# Patient Record
Sex: MALE | Race: WHITE
[De-identification: names, ages, dates, MRNs, and addresses within clinical notes are randomized per-mention and may not be internally consistent; named-entity substitution may affect disease eponyms.]

---

## 2017-03-04 ENCOUNTER — HOSPITAL ENCOUNTER (OUTPATIENT)
Dept: HOSPITAL 18 - NAV LAB | Age: 72
Discharge: HOME | End: 2017-03-04
Payer: MEDICARE

## 2017-03-04 DIAGNOSIS — E78.2: ICD-10-CM

## 2017-03-04 DIAGNOSIS — E29.1: ICD-10-CM

## 2017-03-04 DIAGNOSIS — E11.9: ICD-10-CM

## 2017-03-04 DIAGNOSIS — E03.9: Primary | ICD-10-CM

## 2017-03-04 DIAGNOSIS — E55.9: ICD-10-CM

## 2017-03-04 LAB
ALP SERPL-CCNC: 76 U/L (ref 40–150)
ALT SERPL W P-5'-P-CCNC: 14 U/L (ref 0–55)
ANION GAP SERPL CALC-SCNC: 16 MMOL/L (ref 10–20)
AST SERPL-CCNC: 15 U/L (ref 5–34)
BASOPHILS # BLD AUTO: 0.1 THOU/UL (ref 0–0.2)
BASOPHILS NFR BLD AUTO: 1 % (ref 0–1)
BILIRUB SERPL-MCNC: 0.9 MG/DL (ref 0.2–1.2)
BUN SERPL-MCNC: 24 MG/DL (ref 8.4–25.7)
CALCIUM SERPL-MCNC: 9.3 MG/DL (ref 7.8–10.44)
CHLORIDE SERPL-SCNC: 106 MMOL/L (ref 98–107)
CO2 SERPL-SCNC: 23 MMOL/L (ref 23–31)
CREAT CL PREDICTED SERPL C-G-VRATE: 0 ML/MIN (ref 70–130)
EOSINOPHIL # BLD AUTO: 0.1 THOU/UL (ref 0–0.7)
EOSINOPHIL NFR BLD AUTO: 2 % (ref 0–10)
GLOBULIN SER CALC-MCNC: 2.9 G/DL (ref 2.4–3.5)
HCT VFR BLD CALC: 48.6 % (ref 42–52)
LYMPHOCYTES # BLD AUTO: 1.3 THOU/UL (ref 1.2–3.4)
LYMPHOCYTES NFR BLD AUTO: 18.7 % (ref 21–51)
MONOCYTES # BLD AUTO: 0.7 THOU/UL (ref 0.11–0.59)
MONOCYTES NFR BLD AUTO: 10.5 % (ref 0–10)
NEUTROPHILS # BLD AUTO: 4.8 THOU/UL (ref 1.4–6.5)
RBC # BLD AUTO: 5.28 MILL/UL (ref 4.7–6.1)
TESTOST FREE SERPL-MCNC: 99 PG/ML (ref 47–244)
WBC # BLD AUTO: 7.1 THOU/UL (ref 4.8–10.8)

## 2017-03-04 PROCEDURE — 84270 ASSAY OF SEX HORMONE GLOBUL: CPT

## 2017-03-04 PROCEDURE — 84443 ASSAY THYROID STIM HORMONE: CPT

## 2017-03-04 PROCEDURE — 82306 VITAMIN D 25 HYDROXY: CPT

## 2017-03-04 PROCEDURE — 85025 COMPLETE CBC W/AUTO DIFF WBC: CPT

## 2017-03-04 PROCEDURE — 84439 ASSAY OF FREE THYROXINE: CPT

## 2017-03-04 PROCEDURE — 83036 HEMOGLOBIN GLYCOSYLATED A1C: CPT

## 2017-03-04 PROCEDURE — 84403 ASSAY OF TOTAL TESTOSTERONE: CPT

## 2017-03-04 PROCEDURE — 80053 COMPREHEN METABOLIC PANEL: CPT

## 2017-03-04 PROCEDURE — 82672 ASSAY OF ESTROGEN: CPT

## 2017-07-07 ENCOUNTER — HOSPITAL ENCOUNTER (OUTPATIENT)
Dept: HOSPITAL 18 - NAV LAB | Age: 72
Discharge: HOME | End: 2017-07-07
Payer: MEDICARE

## 2017-07-07 DIAGNOSIS — E03.9: Primary | ICD-10-CM

## 2017-07-07 DIAGNOSIS — E55.9: ICD-10-CM

## 2017-07-07 DIAGNOSIS — E11.9: ICD-10-CM

## 2017-07-07 DIAGNOSIS — E78.2: ICD-10-CM

## 2017-07-07 DIAGNOSIS — E29.1: ICD-10-CM

## 2017-07-07 LAB
25(OH)D3+25(OH)D2 SERPL-MCNC: 44.9 NG/ML (ref 30–?)
ALBUMIN (W/TESTOSTERONE PANEL): 4.1 G/DL
ALBUMIN SERPL BCG-MCNC: 4.1 G/DL (ref 3.4–4.8)
ALP SERPL-CCNC: 77 U/L (ref 40–150)
ALT SERPL W P-5'-P-CCNC: 8 U/L (ref 8–55)
ANION GAP SERPL CALC-SCNC: 13 MMOL/L (ref 10–20)
AST SERPL-CCNC: 15 U/L (ref 5–34)
BILIRUB SERPL-MCNC: 1.1 MG/DL (ref 0.2–1.2)
BUN SERPL-MCNC: 22 MG/DL (ref 8.4–25.7)
CALCIUM SERPL-MCNC: 9.5 MG/DL (ref 7.8–10.44)
CHD RISK SERPL-RTO: 2.7 (ref ?–4.5)
CHLORIDE SERPL-SCNC: 104 MMOL/L (ref 98–107)
CHOLEST SERPL-MCNC: 90 MG/DL
CO2 SERPL-SCNC: 26 MMOL/L (ref 23–31)
CREAT CL PREDICTED SERPL C-G-VRATE: 0 ML/MIN (ref 70–130)
GLOBULIN SER CALC-MCNC: 3.1 G/DL (ref 2.4–3.5)
GLUCOSE SERPL-MCNC: 108 MG/DL (ref 83–110)
HDLC SERPL-MCNC: 33 MG/DL
LDLC SERPL CALC-MCNC: 44 MG/DL
POTASSIUM SERPL-SCNC: 4.8 MMOL/L (ref 3.5–5.1)
SODIUM SERPL-SCNC: 138 MMOL/L (ref 136–145)
T4 FREE SERPL-MCNC: 1.14 NG/DL (ref 0.7–1.48)
TESTOST FREE SERPL-MCNC: 117.7 PG/ML (ref 47–244)
TRIGL SERPL-MCNC: 64 MG/DL (ref ?–150)
TSH SERPL DL<=0.005 MIU/L-ACNC: 1.6 UIU/ML (ref 0.35–4.94)

## 2017-07-07 PROCEDURE — 36415 COLL VENOUS BLD VENIPUNCTURE: CPT

## 2017-07-07 PROCEDURE — 84439 ASSAY OF FREE THYROXINE: CPT

## 2017-07-07 PROCEDURE — 80061 LIPID PANEL: CPT

## 2017-07-07 PROCEDURE — 82306 VITAMIN D 25 HYDROXY: CPT

## 2017-07-07 PROCEDURE — 83036 HEMOGLOBIN GLYCOSYLATED A1C: CPT

## 2017-07-07 PROCEDURE — 84270 ASSAY OF SEX HORMONE GLOBUL: CPT

## 2017-07-07 PROCEDURE — 80053 COMPREHEN METABOLIC PANEL: CPT

## 2017-07-07 PROCEDURE — 84443 ASSAY THYROID STIM HORMONE: CPT

## 2017-07-07 PROCEDURE — 84403 ASSAY OF TOTAL TESTOSTERONE: CPT

## 2017-07-07 PROCEDURE — 82672 ASSAY OF ESTROGEN: CPT

## 2017-07-07 PROCEDURE — 82043 UR ALBUMIN QUANTITATIVE: CPT

## 2018-03-04 ENCOUNTER — HOSPITAL ENCOUNTER (EMERGENCY)
Dept: HOSPITAL 18 - NAV ERS | Age: 73
Discharge: HOME | End: 2018-03-04
Payer: MEDICARE

## 2018-03-04 DIAGNOSIS — R53.1: Primary | ICD-10-CM

## 2018-03-04 DIAGNOSIS — E11.9: ICD-10-CM

## 2018-03-04 DIAGNOSIS — I25.10: ICD-10-CM

## 2018-03-04 DIAGNOSIS — J45.909: ICD-10-CM

## 2018-03-04 DIAGNOSIS — E03.9: ICD-10-CM

## 2018-03-04 DIAGNOSIS — K21.9: ICD-10-CM

## 2018-03-04 DIAGNOSIS — I10: ICD-10-CM

## 2018-03-04 DIAGNOSIS — Z79.02: ICD-10-CM

## 2018-03-04 DIAGNOSIS — E78.2: ICD-10-CM

## 2018-03-04 DIAGNOSIS — Z79.899: ICD-10-CM

## 2018-03-04 DIAGNOSIS — I25.2: ICD-10-CM

## 2018-03-04 LAB
ANION GAP SERPL CALC-SCNC: 16 MMOL/L (ref 10–20)
BASOPHILS # BLD AUTO: 0.1 THOU/UL (ref 0–0.2)
BASOPHILS NFR BLD AUTO: 1.2 % (ref 0–1)
BUN SERPL-MCNC: 23 MG/DL (ref 8.4–25.7)
CALCIUM SERPL-MCNC: 9.1 MG/DL (ref 7.8–10.44)
CHLORIDE SERPL-SCNC: 104 MMOL/L (ref 98–107)
CK MB SERPL-MCNC: 0.9 NG/ML (ref 0–6.6)
CO2 SERPL-SCNC: 22 MMOL/L (ref 23–31)
CREAT CL PREDICTED SERPL C-G-VRATE: 0 ML/MIN (ref 70–130)
EOSINOPHIL # BLD AUTO: 0.3 THOU/UL (ref 0–0.7)
EOSINOPHIL NFR BLD AUTO: 2.8 % (ref 0–10)
GLUCOSE SERPL-MCNC: 140 MG/DL (ref 83–110)
HGB BLD-MCNC: 13.9 G/DL (ref 14–18)
LYMPHOCYTES # BLD AUTO: 1.1 THOU/UL (ref 1.2–3.4)
LYMPHOCYTES NFR BLD AUTO: 11.8 % (ref 21–51)
MCH RBC QN AUTO: 28 PG (ref 27–31)
MCV RBC AUTO: 86.9 FL (ref 80–94)
MONOCYTES # BLD AUTO: 0.8 THOU/UL (ref 0.11–0.59)
MONOCYTES NFR BLD AUTO: 9.1 % (ref 0–10)
NEUTROPHILS # BLD AUTO: 7 THOU/UL (ref 1.4–6.5)
NEUTROPHILS NFR BLD AUTO: 75.2 % (ref 42–75)
PLATELET # BLD AUTO: 153 THOU/UL (ref 130–400)
POTASSIUM SERPL-SCNC: 4.6 MMOL/L (ref 3.5–5.1)
PROT UR STRIP.AUTO-MCNC: 100 MG/DL
RBC # BLD AUTO: 4.95 MILL/UL (ref 4.7–6.1)
RBC UR QL AUTO: (no result) HPF (ref 0–3)
SODIUM SERPL-SCNC: 137 MMOL/L (ref 136–145)
SP GR UR STRIP: 1.02 (ref 1–1.03)
TROPONIN I SERPL DL<=0.01 NG/ML-MCNC: 0.02 NG/ML (ref ?–0.03)
WBC # BLD AUTO: 9.3 THOU/UL (ref 4.8–10.8)

## 2018-03-04 PROCEDURE — 93005 ELECTROCARDIOGRAM TRACING: CPT

## 2018-03-04 PROCEDURE — 83605 ASSAY OF LACTIC ACID: CPT

## 2018-03-04 PROCEDURE — 82553 CREATINE MB FRACTION: CPT

## 2018-03-04 PROCEDURE — 36416 COLLJ CAPILLARY BLOOD SPEC: CPT

## 2018-03-04 PROCEDURE — 85025 COMPLETE CBC W/AUTO DIFF WBC: CPT

## 2018-03-04 PROCEDURE — 85379 FIBRIN DEGRADATION QUANT: CPT

## 2018-03-04 PROCEDURE — 84484 ASSAY OF TROPONIN QUANT: CPT

## 2018-03-04 PROCEDURE — 80048 BASIC METABOLIC PNL TOTAL CA: CPT

## 2018-03-04 PROCEDURE — 96375 TX/PRO/DX INJ NEW DRUG ADDON: CPT

## 2018-03-04 PROCEDURE — 81003 URINALYSIS AUTO W/O SCOPE: CPT

## 2018-03-04 PROCEDURE — 96374 THER/PROPH/DIAG INJ IV PUSH: CPT

## 2018-03-04 PROCEDURE — 81015 MICROSCOPIC EXAM OF URINE: CPT

## 2018-03-04 PROCEDURE — 83880 ASSAY OF NATRIURETIC PEPTIDE: CPT

## 2018-03-04 PROCEDURE — S0028 INJECTION, FAMOTIDINE, 20 MG: HCPCS

## 2018-03-04 PROCEDURE — 71045 X-RAY EXAM CHEST 1 VIEW: CPT

## 2018-03-08 ENCOUNTER — HOSPITAL ENCOUNTER (OUTPATIENT)
Dept: HOSPITAL 92 - ERS | Age: 73
Setting detail: OBSERVATION
LOS: 2 days | Discharge: HOME | End: 2018-03-10
Attending: INTERNAL MEDICINE | Admitting: INTERNAL MEDICINE
Payer: MEDICARE

## 2018-03-08 VITALS — BODY MASS INDEX: 29.4 KG/M2

## 2018-03-08 DIAGNOSIS — I25.10: ICD-10-CM

## 2018-03-08 DIAGNOSIS — E87.1: ICD-10-CM

## 2018-03-08 DIAGNOSIS — K21.9: ICD-10-CM

## 2018-03-08 DIAGNOSIS — Z79.84: ICD-10-CM

## 2018-03-08 DIAGNOSIS — Z79.899: ICD-10-CM

## 2018-03-08 DIAGNOSIS — J45.909: ICD-10-CM

## 2018-03-08 DIAGNOSIS — Z95.810: ICD-10-CM

## 2018-03-08 DIAGNOSIS — Z79.51: ICD-10-CM

## 2018-03-08 DIAGNOSIS — Z95.5: ICD-10-CM

## 2018-03-08 DIAGNOSIS — Z98.890: ICD-10-CM

## 2018-03-08 DIAGNOSIS — K52.9: ICD-10-CM

## 2018-03-08 DIAGNOSIS — I50.23: ICD-10-CM

## 2018-03-08 DIAGNOSIS — Z79.82: ICD-10-CM

## 2018-03-08 DIAGNOSIS — N18.9: ICD-10-CM

## 2018-03-08 DIAGNOSIS — E03.9: ICD-10-CM

## 2018-03-08 DIAGNOSIS — Z79.02: ICD-10-CM

## 2018-03-08 DIAGNOSIS — F32.9: ICD-10-CM

## 2018-03-08 DIAGNOSIS — E11.22: Primary | ICD-10-CM

## 2018-03-08 DIAGNOSIS — Z95.1: ICD-10-CM

## 2018-03-08 DIAGNOSIS — I25.2: ICD-10-CM

## 2018-03-08 DIAGNOSIS — E78.5: ICD-10-CM

## 2018-03-08 DIAGNOSIS — Z90.89: ICD-10-CM

## 2018-03-08 DIAGNOSIS — I13.0: ICD-10-CM

## 2018-03-08 LAB
ALBUMIN SERPL BCG-MCNC: 3.8 G/DL (ref 3.4–4.8)
ALP SERPL-CCNC: 88 U/L (ref 40–150)
ALT SERPL W P-5'-P-CCNC: 11 U/L (ref 8–55)
ANION GAP SERPL CALC-SCNC: 15 MMOL/L (ref 10–20)
AST SERPL-CCNC: 13 U/L (ref 5–34)
BASOPHILS # BLD AUTO: 0.1 THOU/UL (ref 0–0.2)
BASOPHILS NFR BLD AUTO: 0.6 % (ref 0–1)
BILIRUB SERPL-MCNC: 0.6 MG/DL (ref 0.2–1.2)
BUN SERPL-MCNC: 19 MG/DL (ref 8.4–25.7)
CALCIUM SERPL-MCNC: 9.5 MG/DL (ref 7.8–10.44)
CHLORIDE SERPL-SCNC: 102 MMOL/L (ref 98–107)
CK MB SERPL-MCNC: 1 NG/ML (ref 0–6.6)
CK SERPL-CCNC: 45 U/L (ref 30–200)
CO2 SERPL-SCNC: 21 MMOL/L (ref 23–31)
CREAT CL PREDICTED SERPL C-G-VRATE: 0 ML/MIN (ref 70–130)
EOSINOPHIL # BLD AUTO: 0.4 THOU/UL (ref 0–0.7)
EOSINOPHIL NFR BLD AUTO: 4.4 % (ref 0–10)
GLOBULIN SER CALC-MCNC: 3.3 G/DL (ref 2.4–3.5)
GLUCOSE SERPL-MCNC: 153 MG/DL (ref 83–110)
HGB BLD-MCNC: 14.1 G/DL (ref 14–18)
LYMPHOCYTES # BLD: 1 THOU/UL (ref 1.2–3.4)
LYMPHOCYTES NFR BLD AUTO: 10.4 % (ref 21–51)
MCH RBC QN AUTO: 29.3 PG (ref 27–31)
MCV RBC AUTO: 91.2 FL (ref 80–94)
MONOCYTES # BLD AUTO: 0.9 THOU/UL (ref 0.11–0.59)
MONOCYTES NFR BLD AUTO: 9.8 % (ref 0–10)
NEUTROPHILS # BLD AUTO: 6.9 THOU/UL (ref 1.4–6.5)
NEUTROPHILS NFR BLD AUTO: 74.8 % (ref 42–75)
PLATELET # BLD AUTO: 189 THOU/UL (ref 130–400)
POTASSIUM SERPL-SCNC: 4.4 MMOL/L (ref 3.5–5.1)
RBC # BLD AUTO: 4.81 MILL/UL (ref 4.7–6.1)
SODIUM SERPL-SCNC: 134 MMOL/L (ref 136–145)
TROPONIN I SERPL DL<=0.01 NG/ML-MCNC: 0.02 NG/ML (ref ?–0.03)
TROPONIN I SERPL DL<=0.01 NG/ML-MCNC: 0.03 NG/ML (ref ?–0.03)
TROPONIN I SERPL DL<=0.01 NG/ML-MCNC: 0.03 NG/ML (ref ?–0.03)
WBC # BLD AUTO: 9.2 THOU/UL (ref 4.8–10.8)

## 2018-03-08 PROCEDURE — 96376 TX/PRO/DX INJ SAME DRUG ADON: CPT

## 2018-03-08 PROCEDURE — 82550 ASSAY OF CK (CPK): CPT

## 2018-03-08 PROCEDURE — 80053 COMPREHEN METABOLIC PANEL: CPT

## 2018-03-08 PROCEDURE — 83880 ASSAY OF NATRIURETIC PEPTIDE: CPT

## 2018-03-08 PROCEDURE — 94640 AIRWAY INHALATION TREATMENT: CPT

## 2018-03-08 PROCEDURE — 82962 GLUCOSE BLOOD TEST: CPT

## 2018-03-08 PROCEDURE — A4216 STERILE WATER/SALINE, 10 ML: HCPCS

## 2018-03-08 PROCEDURE — 96374 THER/PROPH/DIAG INJ IV PUSH: CPT

## 2018-03-08 PROCEDURE — 99285 EMERGENCY DEPT VISIT HI MDM: CPT

## 2018-03-08 PROCEDURE — 36416 COLLJ CAPILLARY BLOOD SPEC: CPT

## 2018-03-08 PROCEDURE — 97139 UNLISTED THERAPEUTIC PX: CPT

## 2018-03-08 PROCEDURE — 82553 CREATINE MB FRACTION: CPT

## 2018-03-08 PROCEDURE — 85379 FIBRIN DEGRADATION QUANT: CPT

## 2018-03-08 PROCEDURE — 80048 BASIC METABOLIC PNL TOTAL CA: CPT

## 2018-03-08 PROCEDURE — G0378 HOSPITAL OBSERVATION PER HR: HCPCS

## 2018-03-08 PROCEDURE — 84484 ASSAY OF TROPONIN QUANT: CPT

## 2018-03-08 PROCEDURE — 36415 COLL VENOUS BLD VENIPUNCTURE: CPT

## 2018-03-08 PROCEDURE — 93798 PHYS/QHP OP CAR RHAB W/ECG: CPT

## 2018-03-08 PROCEDURE — 93306 TTE W/DOPPLER COMPLETE: CPT

## 2018-03-08 PROCEDURE — 85025 COMPLETE CBC W/AUTO DIFF WBC: CPT

## 2018-03-08 PROCEDURE — 71275 CT ANGIOGRAPHY CHEST: CPT

## 2018-03-08 PROCEDURE — 71045 X-RAY EXAM CHEST 1 VIEW: CPT

## 2018-03-08 PROCEDURE — 93005 ELECTROCARDIOGRAM TRACING: CPT

## 2018-03-08 RX ADMIN — Medication SCH ML: at 21:36

## 2018-03-08 NOTE — HP
PRIMARY CARE PHYSICIAN:  Italo Bansal M.D.

 

CHIEF COMPLAINT:  Shortness of breath.

 

HISTORY OF PRESENT ILLNESS:  Mr. Bradford is a pleasant 72-year-old gentleman who was seen at [facility] on 2018.  He reports that he has not been feeling well over the last s
everal weeks.  He reports having flu-like symptoms in 2017.  He also reports being treated with st
eroids for possible allergies or asthma exacerbation over the last few weeks.  He reports orthopnea a
nd paroxysmal nocturnal dyspnea.  He denies any lower extremity edema.  He reports cough that is occa
sionally productive of mucus.  He denies any wheezing.  He denies any nausea or vomiting.  He denies 
any chest pain.  He denies any fevers.

 

He reports feeling depressed.  He reports that his wife  in  and his only son  in  last year.  His mother  in 2017.

 

He denies any abdominal pain.  He denies any diarrhea.  He reports generalized weakness.  He reports 
feeling low in terms of energy.  He reports being evaluated at a different emergency room a few days 
ago.

 

REVIEW OF SYSTEMS:  The following complete review of systems was

negative, unless otherwise mentioned in the HPI or below:

Constitutional:  Weight loss or gain, ability to conduct usual

activities.

Skin:  Rash, itching.

Eyes:  Double vision, pain.

ENT/Mouth:  Nose bleeding, neck stiffness, pain, tenderness.

Cardiovascular:  Palpitations, dyspnea on exertion, orthopnea.

Respiratory:  Shortness of breath, wheezing, cough, hemoptysis, 

fever or night sweats.

Gastrointestinal:  Poor appetite, abdominal pain, heartburn, 

nausea, vomiting, constipation, or diarrhea.

Genitourinary:  Urgency, frequency, dysuria, nocturia.

Musculoskeletal:  Pain, swelling.

Neurologic/Psychiatric:  Anxiety, depression.

Allergy/Immunologic:  Skin rash, bleeding tendency.

 

PAST MEDICAL HISTORY:  Significant for chronic diarrhea x5 years, coronary artery disease, myocardial
 infarction, PCI with stent, diabetes mellitus type 2, hypothyroidism, diverticulosis, gallstones, ga
stroesophageal reflux disease, dyslipidemia, hypertension, and asthma.

 

PAST SURGICAL HISTORY:  Significant for coronary artery bypass graft, 4 vessels, AICD placement, sten
t placement.

 

SOCIAL HISTORY:  The patient denies tobacco use or recreational drug use.  He drinks alcohol occasion
ally.

 

CODE STATUS:  I discussed his code status.  He is FULL CODE.

 

FAMILY HISTORY:  He denies any family history of premature coronary artery disease.

 

ALLERGIES:  No known drug allergies.

 

CURRENT MEDICATIONS:  Include Primidone 50 mg 2 times a day, aspirin 81 mg daily, atorvastatin 20 mg 
daily, Astelin nasal spray daily, Coreg 12.5 mg 2 times a day, Zyrtec 1 tablet daily, Plavix half a t
ablet daily, Advair HFA 2 puffs daily, levothyroxine 75 mcg daily, lisinopril 2.5 mg 2 times a day, m
agnesium oxide 400 mg daily, Zantac 150 mg 2 times a day, Janumet half a tablet 2 times a day, spiron
olactone 25 mg daily, Torsemide 10 mg daily.

 

PHYSICAL EXAMINATION:

GENERAL:  Mr. Fenton is awake and alert, not in acute distress.

VITAL SIGNS:  Blood pressure is 130/72, pulse is 79, he is breathing at rate of 20 and saturating 96%
 on room air.  He is afebrile.

EYES:  No scleral icterus.  No conjunctival pallor.

ENT:  Moist mucosal membranes, no oropharyngeal erythema or exudates.

NECK:  Supple, nontender, normal range of movement, he has jugular venous distention.

RESPIRATORY:  Accessory muscles of breathing are not active.  Chest wall movements are symmetric bila
terally.

LUNGS:  Clear to auscultation without wheeze, rhonchi or crepitations.

CARDIOVASCULAR:  S1 and S2 are heard, regular.

LUNGS:  Peripheral pulses palpable.  No pericardial rub, no carotid bruit.

ABDOMEN:  Soft, nontender, bowel sounds are heard, no hepatomegaly, no splenomegaly.

NEUROLOGIC:  Cranial nerves II-XII intact.  Deep tendon reflexes are 2+.

SKIN:  No rashes or subcutaneous nodules.  He has 1+ bilateral ankle edema.

MUSCULOSKELETAL:  Power is 5/5 in all 4 extremities.

LYMPHATIC:  No cervical lymphadenopathy.

PSYCHIATRIC:  Flat affect, the patient is oriented to person, place, and time.

 

LABORATORY DATA:  Mr. Bradford's labs and investigations were reviewed.  I reviewed his electrocardiogr
am, which shows normal sinus rhythm, no ST changes to suggest an acute coronary syndrome.  I also rev
iewed his chest x-ray, which does not show any pulmonary infiltrates or pulmonary edema.

 

Laboratory investigation show an unremarkable CBC, hyponatremia with sodium of 134, normal potassium,
 normal creatinine, normal LFTs, elevated BNP of 2673 and normal troponin I.

 

ASSESSMENT AND PLAN:  Mr. Bradford is a pleasant 72-year-old gentleman who was seen at [facility] on 2018.  His problem list includes:

1.  Shortness of breath:  Mr. Bradford is presenting with shortness of breath.  At this point in time, 
several possibilities exist, including congestive heart failure, asthma and other pulmonary causes an
d psychological causes.  He will be admitted to the hospital for further management.

2.  Congestive heart failure.  He will be treated with intravenous diuretics.  Cardiology service chayo
l be consulted for optimization of his medications.  We will also check 2D echocardiogram.

3.  Hyponatremia:  Mild, we will recheck.

4.  Diabetes mellitus type 2, start Accu-Cheks, insulin sliding scale.

5.  Coronary artery disease:  Appears stable at this time.

6.  Hypothyroidism:  Continue thyroid replacement therapy.

7.  Dyslipidemia:  Continue statin.

8.  Hypertension:  Monitor vital signs, titrate antihypertensives as needed.

9.  Asthma:  P.r.n., bronchodilators and nebulizers.

10.  Depression:  Suspected.  I have advised the patient to follow up with mental health services as 
outpatient.

 

Many thanks for allowing me to participate in your patient's care.  Please feel free to contact me wi
th any questions or concerns.

 

LEVEL OF RISK:  High.

 

LEVEL OF COMPLEXITY:  High.

## 2018-03-08 NOTE — RAD
CHEST 1 VIEW:

 

Date:  03/08/18 

 

HISTORY:  

Dyspnea. 

 

COMPARISON:  

03/04/18. 

 

FINDINGS:

Cardiac silhouette is magnified and upper limits of normal in size. Pulmonary vasculature remains upp
er limits of normal. Mediastinum is midline with postoperative changes, aortic calcification, and a m
ultilead left subclavian cardiac electronic device. No lobar consolidation or evidence of pneumothora
x. 

 

IMPRESSION: 

Borderline cardiomegaly and other chronic-type findings are stable. No active cardiopulmonary abnorma
lities are demonstrated. 

 

 

POS: [facility]

## 2018-03-09 LAB
ANION GAP SERPL CALC-SCNC: 13 MMOL/L (ref 10–20)
BASOPHILS # BLD AUTO: 0.1 THOU/UL (ref 0–0.2)
BASOPHILS NFR BLD AUTO: 0.6 % (ref 0–1)
BUN SERPL-MCNC: 19 MG/DL (ref 8.4–25.7)
CALCIUM SERPL-MCNC: 9.3 MG/DL (ref 7.8–10.44)
CHLORIDE SERPL-SCNC: 100 MMOL/L (ref 98–107)
CO2 SERPL-SCNC: 27 MMOL/L (ref 23–31)
CREAT CL PREDICTED SERPL C-G-VRATE: 76 ML/MIN (ref 70–130)
EOSINOPHIL # BLD AUTO: 0.4 THOU/UL (ref 0–0.7)
EOSINOPHIL NFR BLD AUTO: 4.4 % (ref 0–10)
GLUCOSE SERPL-MCNC: 138 MG/DL (ref 83–110)
HGB BLD-MCNC: 13.6 G/DL (ref 14–18)
LYMPHOCYTES # BLD: 1.3 THOU/UL (ref 1.2–3.4)
LYMPHOCYTES NFR BLD AUTO: 14.8 % (ref 21–51)
MCH RBC QN AUTO: 29.2 PG (ref 27–31)
MCV RBC AUTO: 90.4 FL (ref 80–94)
MONOCYTES # BLD AUTO: 0.9 THOU/UL (ref 0.11–0.59)
MONOCYTES NFR BLD AUTO: 10.7 % (ref 0–10)
NEUTROPHILS # BLD AUTO: 6 THOU/UL (ref 1.4–6.5)
NEUTROPHILS NFR BLD AUTO: 69.5 % (ref 42–75)
PLATELET # BLD AUTO: 192 THOU/UL (ref 130–400)
POTASSIUM SERPL-SCNC: 4 MMOL/L (ref 3.5–5.1)
RBC # BLD AUTO: 4.65 MILL/UL (ref 4.7–6.1)
SODIUM SERPL-SCNC: 136 MMOL/L (ref 136–145)
WBC # BLD AUTO: 8.7 THOU/UL (ref 4.8–10.8)

## 2018-03-09 RX ADMIN — MOMETASONE FUROATE AND FORMOTEROL FUMARATE DIHYDRATE SCH PUFF: 100; 5 AEROSOL RESPIRATORY (INHALATION) at 07:02

## 2018-03-09 RX ADMIN — ALOGLIPTIN SCH MG: 6.25 TABLET, FILM COATED ORAL at 19:54

## 2018-03-09 RX ADMIN — ASPIRIN SCH MG: 81 TABLET ORAL at 09:07

## 2018-03-09 RX ADMIN — Medication SCH ML: at 09:09

## 2018-03-09 RX ADMIN — MOMETASONE FUROATE AND FORMOTEROL FUMARATE DIHYDRATE SCH PUFF: 100; 5 AEROSOL RESPIRATORY (INHALATION) at 20:30

## 2018-03-09 RX ADMIN — Medication SCH ML: at 19:55

## 2018-03-09 RX ADMIN — ALOGLIPTIN SCH MG: 6.25 TABLET, FILM COATED ORAL at 09:09

## 2018-03-09 NOTE — PDOC.PN
- Subjective


Encounter Start Date: 03/09/18


Encounter Start Time: 10:00





Pt seen for followup re: shortness of breath.  Denies chest pain.  SOBOE+.  No 

nausea or vomiting.  Feels depressed.  No suicidal or homicidal ideation.





- Objective


Resuscitation Status: 


 











Resuscitation Status           FULL:Full Resuscitation














MAR Reviewed: Yes


Vital Signs & Weight: 


 Vital Signs (12 hours)











  Temp Pulse Pulse Pulse Resp BP BP


 


 03/09/18 15:23  98.3 F  74    16  


 


 03/09/18 11:44       


 


 03/09/18 11:23  97.8 F  76    24 H  


 


 03/09/18 09:54    80  86   143/75 H  132/66


 


 03/09/18 07:51  97.9 F  85    16  


 


 03/09/18 07:28  98.2 F  85    20  


 


 03/09/18 07:02   85    16  














  BP Pulse Ox Pulse Ox Pulse Ox


 


 03/09/18 15:23  114/65  95  


 


 03/09/18 11:44   98  


 


 03/09/18 11:23  116/57 L  93 L  


 


 03/09/18 09:54    94 L  95


 


 03/09/18 07:51    


 


 03/09/18 07:28  140/73  96  


 


 03/09/18 07:02   97  








 Weight











Weight                         211 lb














I&O: 


 











 03/08/18 03/09/18 03/10/18





 06:59 06:59 06:59


 


Intake Total   20


 


Output Total  950 600


 


Balance  -950 -580











Result Diagrams: 


 03/09/18 03:55





 03/09/18 03:55


Additional Labs: 


 Accuchecks











  03/09/18 03/08/18





  08:31 21:34


 


POC Glucose  155 H  182 H











EKG Reviewed by me: Yes (Tele:  NSR)





Phys Exam





- Physical Examination


Constitutional: NAD


HEENT: moist MMs


Neck: supple


Respiratory: clear to auscultation bilateral


Cardiovascular: RRR


Gastrointestinal: soft


Musculoskeletal: edema present


Neurological: moves all 4 limbs


Psychiatric: A&O x 3


Deviation from normal: Flat affect





Dx/Plan


(1) Shortness of breath


Code(s): R06.02 - SHORTNESS OF BREATH   Status: Acute   Comment: Check d-dimer 

to r/o PE, continue diuretics   





(2) CHF (congestive heart failure)


Code(s): I50.9 - HEART FAILURE, UNSPECIFIED   Status: Acute   Comment: Continue 

diuretics, await cardiology consult and 2D echo   





(3) Hypothyroidism


Code(s): E03.9 - HYPOTHYROIDISM, UNSPECIFIED   Status: Chronic   Comment: 

continue replacement therapy   





(4) Dyslipidemia


Code(s): E78.5 - HYPERLIPIDEMIA, UNSPECIFIED   Status: Chronic   Comment: 

continue statin   





(5) Diverticulosis


Code(s): K57.90 - DVRTCLOS OF INTEST, PART UNSP, W/O PERF OR ABSCESS W/O BLEED 

  Status: Chronic   Comment: Stable   





(6) Depression


Code(s): F32.9 - MAJOR DEPRESSIVE DISORDER, SINGLE EPISODE, UNSPECIFIED   Status

: Chronic   Comment: Discussed with pt re: need to establish care with a 

psychiatrist   





- Plan


out of bed/ambulate





* .








Review of Systems





- Review of Systems


Constitutional: negative: fever, chills, sweats, weakness, malaise


Respiratory: Shortness of Breath, SOB with Excertion.  negative: Cough, Dry, 

Hemoptysis, Pleuritic Pain, Sputum, Wheezing


Cardiovascular: negative: chest pain, palpitations, orthopnea, paroxysmal 

nocturnal dyspnea, edema, light headedness





- Medications/Allergies


Allergies/Adverse Reactions: 


 Allergies











Allergy/AdvReac Type Severity Reaction Status Date / Time


 


No Known Drug Allergies Allergy   Verified 03/08/18 17:48











Medications: 


 Current Medications





Acetaminophen (Tylenol)  650 mg PO Q4H PRN


   PRN Reason: Headache/Fever or Pain


Acetaminophen (Tylenol)  650 mg TN Q4H PRN


   PRN Reason: Headache/Fever or Pain


Albuterol Sulfate (Proventil Hfa)  2 puff INH Q4H PRN


   PRN Reason: SOB &/or Wheezing


   Last Admin: 03/08/18 22:20 Dose:  2 puff


Alogliptin Benzoate (Alogliptin)  6.25 mg PO BID [facility]


   Last Admin: 03/09/18 09:09 Dose:  6.25 mg


Aspirin (Ecotrin)  81 mg PO DAILY [facility]


   Last Admin: 03/09/18 09:07 Dose:  81 mg


Atorvastatin Calcium (Lipitor)  20 mg PO HS [facility]


   Last Admin: 03/08/18 21:33 Dose:  20 mg


Bisacodyl (Dulcolax)  10 mg PO DAILYPRN PRN


   PRN Reason: Constipation


Carvedilol (Coreg)  25 mg PO BID[facility]


   Last Admin: 03/09/18 09:06 Dose:  25 mg


Clopidogrel Bisulfate (Plavix)  37.5 mg PO DAILY [facility]


   Last Admin: 03/09/18 09:07 Dose:  37.5 mg


Dextrose/Water (Dextrose 50%)  25 gm SLOW IVP PRN PRN


   PRN Reason: Hypoglycemia


Famotidine (Pepcid)  20 mg PO BID [facility]


   Last Admin: 03/09/18 09:08 Dose:  20 mg


Furosemide (Lasix)  40 mg SLOW IVP 0600,1400 [facility]


   Last Admin: 03/09/18 14:08 Dose:  40 mg


Glucagon (Glucagon)  1 mg IM PRN PRN


   PRN Reason: Hypoglycemia


Dextrose/Water (D5w)  1,000 mls @ 0 mls/hr IV .Q0M PRN; As Directed


   PRN Reason: Hypoglycemia


Insulin Human Lispro (Humalog)  0 units SC .MILD SLIDING SCALE PRN


   PRN Reason: Mild Correctional Scale


Levothyroxine Sodium (Synthroid)  75 mcg PO 0600 [facility]


   Last Admin: 03/09/18 06:06 Dose:  75 mcg


Lisinopril (Zestril)  2.5 mg PO BID [facility]


   Last Admin: 03/09/18 09:05 Dose:  2.5 mg


Loratadine (Claritin)  10 mg PO DAILY [facility]


   Last Admin: 03/09/18 09:08 Dose:  10 mg


Metformin HCl (Glucophage)  500 mg PO BID [facility]


   Last Admin: 03/09/18 09:08 Dose:  500 mg


Mometasone Furoate/Formoterol Fumar (Dulera 100 Mcg/5 Mcg Inhaler)  2 puff INH 

BID-RT [facility]


   Last Admin: 03/09/18 07:02 Dose:  2 puff


Primidone (Mysoline)  50 mg PO BID [facility]


   Last Admin: 03/09/18 09:06 Dose:  50 mg


Sodium Chloride (Flush - Normal Saline)  10 ml IVF Q12HR [facility]


   Last Admin: 03/09/18 09:09 Dose:  10 ml


Sodium Chloride (Flush - Normal Saline)  10 ml IVF PRN PRN


   PRN Reason: Saline Flush


Sodium Chloride (Ocean Nasal Spray 0.65%)  0 ml EA NARE PRN PRN


   PRN Reason: Nasal Dryness


   Last Admin: 03/09/18 03:52 Dose:  1 spr


Spironolactone (Aldactone)  25 mg PO DAILY [facility]


   Last Admin: 03/09/18 09:06 Dose:  25 mg

## 2018-03-09 NOTE — CON
DATE OF CONSULTATION:  2018

 

HISTORY:  Sunday Bradford is a 72-year-old white male that I initially evaluated 
in the office in 2015.  He returned several times for followup, however, the 
last time I saw him was in 2017.

 

In 2008, he presented to [facility] with an anterior STEMI.  He had 
60% ostial LAD, 100% mid LAD, 40% first obtuse marginal, 75%-80% mid RCA.  
Promus 3.0 x 12 mm stent was placed in the ostial LAD and Promus 2.25 x 23 mm 
stent was placed in the mid LAD.  Also, Promus 3.0 x 23 mm stent was placed in 
the mid RCA.  Peak CK was 1198,  and troponin I was 55.

 

He began to have worsening ejection fraction and more dyspnea on exertion.  He 
underwent repeat catheterization and was found to have left main and 2-vessel 
coronary artery disease in 2013.  He then underwent CABG x4 at the [facility] by 
Dr. Owens.  There was LIMA to LAD, vein graft to the third diagonal, obtuse 
marginal and right posterior descending.  Dual chamber ICD was placed in .  
When I initially saw him, he was doing well, walking 3 miles on the treadmill.  
Last echo was in 2015 when he had an ejection fraction of 25%-30%.  He was 
last seen in 2017.  At that time, he denied any chest discomfort or 
shortness of breath.  He was to return 6 months later, but has never come back 
for followup.

 

He now presents with increased cough and shortness of breath.  He started to 
have flu-like symptoms in December.  He was placed on steroids for asthma 
exacerbation, but that did not seem to improve his symptoms.  He also has 
developed orthopnea and PND and has difficulty lying flat in bed.  He has not 
noted any significant leg edema.  He denies any chest discomfort.

 

PAST MEDICAL HISTORY:  Diabetes, hypertension, hypercholesterolemia, stent 
placement, coronary artery disease, hypothyroidism, cholelithiasis, GERD, 
history of asthma, history of myocardial infarction.

 

OPERATIONS:  CABG, ICD insertion, right shoulder surgery, tonsillectomy.

 

MEDICATIONS:  Albuterol 2 puffs q.4 hours p.r.n., aspirin 81 daily, 
atorvastatin 20 mg at bedtime, carvedilol 25 mg b.i.d., Zyrtec 10 daily, Plavix 
0.5 mg daily, Advair 1 puff b.i.d., levothyroxine 75 mcg daily, lisinopril 2.5 
mg b.i.d., Primidone 50 b.i.d., spironolactone 25 daily, Torsemide 10 mg daily (
the patient should be taking one-half of a 20 mg torsemide daily, not just 
p.r.n.).

 

ALLERGIES:  None.

 

SOCIAL HISTORY:  He does not smoke or drink.  His wife  in 2015.  His 
dog then .  His son  2017 and then his mother  2017.

 

FAMILY HISTORY:  Negative for coronary artery disease.

 

REVIEW OF SYSTEMS:  A 12-point review of systems is otherwise unremarkable.

 

PHYSICAL EXAMINATION:

VITAL SIGNS:  Blood pressure 116/57, pulse 76.

HEENT:  PERRL.

NECK:  Supple.

CHEST:  Clear.

CARDIAC:  S1, S2 normal without any S3, S4 or murmurs.

ABDOMEN:  Normal bowel sounds without tenderness or organomegaly.

EXTREMITIES:  Revealed no clubbing or cyanosis.  There is trace ankle edema.

NEUROLOGIC:  Grossly intact.

SKIN:  Warm and dry.

 

LABORATORY:  EKG is not found on the chart.  Chest x-ray revealed borderline 
cardiomegaly, but no significant abnormality.  Hemoglobin 13.6, hematocrit 42.0
, white count 8700, platelets 192,000.  Sodium 136, potassium 4.0, chloride 100
, carbon dioxide 27, BUN 19, creatinine 1.20.  Troponin I is up to 0.030.  BNP 
2673.7.

 

IMPRESSION AND PLAN:

1.  The patient had a significantly elevated BNP, although no overt volume 
overload seen on exam.  He will continue to be diuresed and certainly needs to 
be on a higher diuretic dose at home.  Also, in checking his ICD, it was found 
that his ICD is at end of life.

2.  Status post coronary artery bypass graft.

3.  History of myocardial infarction with stent placement.

4.  Status post dual chamber ICD, which is currently at elective replacement 
indicator.

5.  Hypertension.

6.  Hypercholesterolemia.

7.  Diabetes.

8.  Chronic kidney disease.

9.  Depression with death of his wife, dog, mother and son.

 

[facility]

## 2018-03-09 NOTE — CT
CT ANGIOGRAM OF THE CHEST:

 

HISTORY: 

Shortness of breath.  Elevated D-dimer.

 

COMPARISON: 

None.

 

TECHNIQUE: 

CT angiogram of the chest is performed in the axial plane.  Bilateral oblique and coronal 3-dimension
al reformatted images are submitted for interpretation.

 

FINDINGS: 

No mediastinal mass, lymphadenopathy, or hematoma.  Heart size is within normal limits.  No pericardi
al effusion.  Suboptimal evaluation of the aorta due to the lack of contrast, secondary to timing of 
bolus.  There is extensive coronary calcification.

 

There is reflux of contrast into the IVC and hepatic venous system due to right heart failure.  Solid
 organs are grossly unremarkable.  CT evidence of cholelithiasis without definite CT evidence of chol
ecystitis.

 

Trachea and central bronchi are patent.  No consolidation or masses within the lung parenchyma.  Scar
ring and atelectasis in both lower lobes.  No pleural effusion or pneumothorax.

 

Adequate contrast opacification of the pulmonary arterial system to the level of the segmental arteri
es.  No filling defect to imply thromboembolism.

 

No lytic or blastic lesions within the osseous structures.

 

IMPRESSION: 

1.  No evidence of pulmonary artery embolism to the level of the segmental arteries.

 

2.  Right heart failure.

 

POS: [facility]

## 2018-03-10 VITALS — SYSTOLIC BLOOD PRESSURE: 122 MMHG | TEMPERATURE: 98 F | DIASTOLIC BLOOD PRESSURE: 69 MMHG

## 2018-03-10 LAB
ANION GAP SERPL CALC-SCNC: 12 MMOL/L (ref 10–20)
BASOPHILS # BLD AUTO: 0.1 THOU/UL (ref 0–0.2)
BASOPHILS NFR BLD AUTO: 0.9 % (ref 0–1)
BUN SERPL-MCNC: 19 MG/DL (ref 8.4–25.7)
CALCIUM SERPL-MCNC: 9.1 MG/DL (ref 7.8–10.44)
CHLORIDE SERPL-SCNC: 99 MMOL/L (ref 98–107)
CO2 SERPL-SCNC: 28 MMOL/L (ref 23–31)
CREAT CL PREDICTED SERPL C-G-VRATE: 75 ML/MIN (ref 70–130)
EOSINOPHIL # BLD AUTO: 0.4 THOU/UL (ref 0–0.7)
EOSINOPHIL NFR BLD AUTO: 4.6 % (ref 0–10)
GLUCOSE SERPL-MCNC: 144 MG/DL (ref 83–110)
HGB BLD-MCNC: 14.1 G/DL (ref 14–18)
LYMPHOCYTES # BLD: 1.1 THOU/UL (ref 1.2–3.4)
LYMPHOCYTES NFR BLD AUTO: 13.4 % (ref 21–51)
MCH RBC QN AUTO: 29.3 PG (ref 27–31)
MCV RBC AUTO: 89.9 FL (ref 80–94)
MONOCYTES # BLD AUTO: 1 THOU/UL (ref 0.11–0.59)
MONOCYTES NFR BLD AUTO: 12.7 % (ref 0–10)
NEUTROPHILS # BLD AUTO: 5.5 THOU/UL (ref 1.4–6.5)
NEUTROPHILS NFR BLD AUTO: 68.4 % (ref 42–75)
PLATELET # BLD AUTO: 200 THOU/UL (ref 130–400)
POTASSIUM SERPL-SCNC: 3.7 MMOL/L (ref 3.5–5.1)
RBC # BLD AUTO: 4.82 MILL/UL (ref 4.7–6.1)
SODIUM SERPL-SCNC: 135 MMOL/L (ref 136–145)
WBC # BLD AUTO: 8.1 THOU/UL (ref 4.8–10.8)

## 2018-03-10 RX ADMIN — MOMETASONE FUROATE AND FORMOTEROL FUMARATE DIHYDRATE SCH PUFF: 100; 5 AEROSOL RESPIRATORY (INHALATION) at 06:16

## 2018-03-10 RX ADMIN — ASPIRIN SCH MG: 81 TABLET ORAL at 09:37

## 2018-03-10 RX ADMIN — ALOGLIPTIN SCH MG: 6.25 TABLET, FILM COATED ORAL at 09:37

## 2018-03-10 RX ADMIN — Medication SCH ML: at 09:37

## 2018-03-10 NOTE — DIS
DATE OF ADMISSION:  03/08/2018

 

DATE OF DISCHARGE:  03/10/2018

 

PRIMARY CARE PROVIDER:  Italo Bansal M.D.

 

DISCHARGE DIAGNOSES:

1.  Acute on chronic systolic congestive heart failure.

2.  Depression.

 

CONSULTATIONS DURING THIS HOSPITALIZATION:  Dr. Lloyd Christine, Cardiology.

 

CONDITION OF PATIENT ON THE DAY OF DISCHARGE:  Stable.  I assessed Mr. Bradford on the day of discharge
.  He denies any chest pain.  Shortness of breath has improved.

 

PHYSICAL EXAMINATION:

VITAL SIGNS:  Stable and he is maintaining good oxygen saturations on room air.

CARDIOVASCULAR:  S1 and S2 are heard, regular.

LUNGS:  Clear to auscultation bilaterally.

 

DISCHARGE MEDICATIONS:  Proventil HFA 2 puffs every 4 hours as needed, aspirin 81 mg daily, Lipitor 2
0 mg at bedtime, carvedilol 25 mg 2 times a day, cetirizine 10 mg daily, Plavix 37.5 mg daily, Advair
 1 puff 2 times a day, Synthroid 75 mcg daily, lisinopril 2.5 mg 2 times a day, primidone 50 mg 2 robert
es a day, Janumet half a tablet 2 times a day, spironolactone 25 mg daily, Demadex 10 mg daily, Zanta
c 150 mg 2 times a day.

 

HOSPITAL COURSE:  Mr. Bradford is a pleasant 72-year-old gentleman who was admitted to [facility] on observation status on 03/08/2018 for shortness of breath.  He was found to have 
acute exacerbation of congestive heart failure.  He was treated with intravenous diuretics, with impr
ovement of his symptoms.

 

He was also depressed.  He has been advised to follow up with his primary care provider for managemen
t of depression or for a psychiatric referral.  He denied any suicidal or homicidal ideation during t
his hospitalization.

 

He was seen by Cardiology Service.  He had 2D echocardiogram, report is pending at the time of this d
ictation.  He is advised to follow up with his primary care physician for copy of the report.  He als
o had a followup appointment with cardiologist, Dr. Christine in April.

 

He also had a CT angiogram of the chest, which did not show any pulmonary embolism.

 

On the day of discharge, he has an unremarkable CBC, decreased sodium of 135, normal potassium, and n
ormal creatinine.

 

Many thanks for allowing me to participate in your patient's care.  Please feel free to contact me wi
th any questions or concerns.

 

He has also been referred for cardiac rehabilitation.

 

DISCHARGE DESTINATION:  Home.

## 2018-03-11 ENCOUNTER — HOSPITAL ENCOUNTER (EMERGENCY)
Dept: HOSPITAL 92 - ERS | Age: 73
Discharge: HOME | End: 2018-03-11
Payer: MEDICARE

## 2018-03-11 DIAGNOSIS — E78.5: ICD-10-CM

## 2018-03-11 DIAGNOSIS — I25.2: ICD-10-CM

## 2018-03-11 DIAGNOSIS — E86.0: Primary | ICD-10-CM

## 2018-03-11 DIAGNOSIS — Z79.82: ICD-10-CM

## 2018-03-11 DIAGNOSIS — E03.9: ICD-10-CM

## 2018-03-11 DIAGNOSIS — I10: ICD-10-CM

## 2018-03-11 DIAGNOSIS — F32.9: ICD-10-CM

## 2018-03-11 DIAGNOSIS — Z79.899: ICD-10-CM

## 2018-03-11 LAB
ALBUMIN SERPL BCG-MCNC: 4 G/DL (ref 3.4–4.8)
ALP SERPL-CCNC: 96 U/L (ref 40–150)
ALT SERPL W P-5'-P-CCNC: 13 U/L (ref 8–55)
ANION GAP SERPL CALC-SCNC: 19 MMOL/L (ref 10–20)
AST SERPL-CCNC: 18 U/L (ref 5–34)
BASOPHILS # BLD AUTO: 0.1 THOU/UL (ref 0–0.2)
BASOPHILS NFR BLD AUTO: 0.8 % (ref 0–1)
BILIRUB SERPL-MCNC: 0.5 MG/DL (ref 0.2–1.2)
BUN SERPL-MCNC: 34 MG/DL (ref 8.4–25.7)
CALCIUM SERPL-MCNC: 9.7 MG/DL (ref 7.8–10.44)
CHLORIDE SERPL-SCNC: 95 MMOL/L (ref 98–107)
CK MB SERPL-MCNC: 1 NG/ML (ref 0–6.6)
CK SERPL-CCNC: 50 U/L (ref 30–200)
CO2 SERPL-SCNC: 24 MMOL/L (ref 23–31)
CREAT CL PREDICTED SERPL C-G-VRATE: 0 ML/MIN (ref 70–130)
EOSINOPHIL # BLD AUTO: 0.4 THOU/UL (ref 0–0.7)
EOSINOPHIL NFR BLD AUTO: 4.5 % (ref 0–10)
GLOBULIN SER CALC-MCNC: 3.7 G/DL (ref 2.4–3.5)
GLUCOSE SERPL-MCNC: 170 MG/DL (ref 83–110)
HGB BLD-MCNC: 15.1 G/DL (ref 14–18)
LIPASE SERPL-CCNC: 101 U/L (ref 8–78)
LYMPHOCYTES # BLD: 1.2 THOU/UL (ref 1.2–3.4)
LYMPHOCYTES NFR BLD AUTO: 12.4 % (ref 21–51)
MAGNESIUM SERPL-MCNC: 1.9 MG/DL (ref 1.6–2.6)
MCH RBC QN AUTO: 29.2 PG (ref 27–31)
MCV RBC AUTO: 89.5 FL (ref 80–94)
MONOCYTES # BLD AUTO: 1.1 THOU/UL (ref 0.11–0.59)
MONOCYTES NFR BLD AUTO: 11.6 % (ref 0–10)
NEUTROPHILS # BLD AUTO: 6.6 THOU/UL (ref 1.4–6.5)
NEUTROPHILS NFR BLD AUTO: 70.7 % (ref 42–75)
PLATELET # BLD AUTO: 281 THOU/UL (ref 130–400)
POTASSIUM SERPL-SCNC: 4.8 MMOL/L (ref 3.5–5.1)
RBC # BLD AUTO: 5.18 MILL/UL (ref 4.7–6.1)
SODIUM SERPL-SCNC: 133 MMOL/L (ref 136–145)
SP GR UR STRIP: 1.01 (ref 1–1.04)
TROPONIN I SERPL DL<=0.01 NG/ML-MCNC: 0.01 NG/ML (ref ?–0.03)
WBC # BLD AUTO: 9.4 THOU/UL (ref 4.8–10.8)

## 2018-03-11 PROCEDURE — 84443 ASSAY THYROID STIM HORMONE: CPT

## 2018-03-11 PROCEDURE — 93005 ELECTROCARDIOGRAM TRACING: CPT

## 2018-03-11 PROCEDURE — 83735 ASSAY OF MAGNESIUM: CPT

## 2018-03-11 PROCEDURE — 82553 CREATINE MB FRACTION: CPT

## 2018-03-11 PROCEDURE — 81003 URINALYSIS AUTO W/O SCOPE: CPT

## 2018-03-11 PROCEDURE — 94760 N-INVAS EAR/PLS OXIMETRY 1: CPT

## 2018-03-11 PROCEDURE — 83690 ASSAY OF LIPASE: CPT

## 2018-03-11 PROCEDURE — 84484 ASSAY OF TROPONIN QUANT: CPT

## 2018-03-11 PROCEDURE — 80053 COMPREHEN METABOLIC PANEL: CPT

## 2018-03-11 PROCEDURE — 96360 HYDRATION IV INFUSION INIT: CPT

## 2018-03-11 PROCEDURE — 85025 COMPLETE CBC W/AUTO DIFF WBC: CPT

## 2018-03-11 PROCEDURE — 71045 X-RAY EXAM CHEST 1 VIEW: CPT

## 2018-03-11 PROCEDURE — 83880 ASSAY OF NATRIURETIC PEPTIDE: CPT

## 2018-03-11 NOTE — RAD
PORTABLE CHEST ONE VIEW:

 

Date: 3-11-18 

Time: 6:30 p.m.

 

History: Chest pain. 

 

FINDINGS: 

Comparison is made with exam of 3-8-18. 

 

The heart size is stable. Left sided AICD remains in place. No confluent areas of consolidation, pneu
mothoraces, liban pulmonary edema or pleural effusions are seen. 

 

IMPRESSION: 

No acute process.

 

POS: [facility]

## 2018-10-24 ENCOUNTER — HOSPITAL ENCOUNTER (OUTPATIENT)
Dept: HOSPITAL 92 - NM | Age: 73
Discharge: HOME | End: 2018-10-24
Attending: NURSE PRACTITIONER
Payer: MEDICARE

## 2018-10-24 DIAGNOSIS — G25.0: Primary | ICD-10-CM

## 2018-10-24 PROCEDURE — 78607: CPT

## 2018-10-24 PROCEDURE — A9584 IODINE I-123 IOFLUPANE: HCPCS

## 2018-10-26 NOTE — NM
NUCLEAR MEDICINE TOMOGRAPHIC BRAIN IMAGING:

 

History: Tremor. 

 

Technique: A Nuclear Medicine brain imaging tomographic exam was performed using 4.7 mCi I123 Ioflupa
ne. 

 

FINDINGS: 

Uptake in the basal ganglia has a round shape rather than a comet shape. These findings are consisten
t with a Parkinsonian syndrome. 

 

IMPRESSION: 

Findings suggest a Parkinsonian syndrome. 

 

POS: [facility]

## 2020-03-03 ENCOUNTER — HOSPITAL ENCOUNTER (EMERGENCY)
Dept: HOSPITAL 92 - ERS | Age: 75
Discharge: HOME | End: 2020-03-03
Payer: MEDICARE

## 2020-03-03 ENCOUNTER — HOSPITAL ENCOUNTER (EMERGENCY)
Dept: HOSPITAL 18 - NAV ERS | Age: 75
Discharge: ANOTHER HOSPITAL | End: 2020-03-03
Payer: MEDICARE

## 2020-03-03 DIAGNOSIS — I10: ICD-10-CM

## 2020-03-03 DIAGNOSIS — E03.9: ICD-10-CM

## 2020-03-03 DIAGNOSIS — F32.9: ICD-10-CM

## 2020-03-03 DIAGNOSIS — G62.9: ICD-10-CM

## 2020-03-03 DIAGNOSIS — I50.9: ICD-10-CM

## 2020-03-03 DIAGNOSIS — F41.9: ICD-10-CM

## 2020-03-03 DIAGNOSIS — R79.89: ICD-10-CM

## 2020-03-03 DIAGNOSIS — G20: ICD-10-CM

## 2020-03-03 DIAGNOSIS — E11.9: ICD-10-CM

## 2020-03-03 DIAGNOSIS — E78.2: ICD-10-CM

## 2020-03-03 DIAGNOSIS — I25.10: ICD-10-CM

## 2020-03-03 DIAGNOSIS — I25.2: ICD-10-CM

## 2020-03-03 DIAGNOSIS — K21.9: ICD-10-CM

## 2020-03-03 DIAGNOSIS — J11.1: Primary | ICD-10-CM

## 2020-03-03 DIAGNOSIS — J10.1: Primary | ICD-10-CM

## 2020-03-03 DIAGNOSIS — I11.0: ICD-10-CM

## 2020-03-03 DIAGNOSIS — Z87.891: ICD-10-CM

## 2020-03-03 DIAGNOSIS — J45.909: ICD-10-CM

## 2020-03-03 LAB
ALBUMIN SERPL BCG-MCNC: 4.1 G/DL (ref 3.4–4.8)
ALP SERPL-CCNC: 111 U/L (ref 40–110)
ALT SERPL W P-5'-P-CCNC: 10 U/L (ref 8–55)
ANION GAP SERPL CALC-SCNC: 18 MMOL/L (ref 10–20)
AST SERPL-CCNC: 26 U/L (ref 5–34)
BASOPHILS # BLD AUTO: 0.1 THOU/UL (ref 0–0.2)
BASOPHILS NFR BLD AUTO: 0.9 % (ref 0–1)
BILIRUB SERPL-MCNC: 1 MG/DL (ref 0.2–1.2)
BUN SERPL-MCNC: 18 MG/DL (ref 8.4–25.7)
CALCIUM SERPL-MCNC: 8.9 MG/DL (ref 7.8–10.44)
CHLORIDE SERPL-SCNC: 101 MMOL/L (ref 98–107)
CK MB SERPL-MCNC: 0.4 NG/ML (ref 0–6.6)
CO2 SERPL-SCNC: 25 MMOL/L (ref 23–31)
CREAT CL PREDICTED SERPL C-G-VRATE: 0 ML/MIN (ref 70–130)
EOSINOPHIL # BLD AUTO: 0 THOU/UL (ref 0–0.7)
EOSINOPHIL NFR BLD AUTO: 0.2 % (ref 0–10)
GLOBULIN SER CALC-MCNC: 3.2 G/DL (ref 2.4–3.5)
GLUCOSE SERPL-MCNC: 166 MG/DL (ref 83–110)
HGB BLD-MCNC: 13 G/DL (ref 14–18)
LYMPHOCYTES # BLD AUTO: 0.4 THOU/UL (ref 1.2–3.4)
LYMPHOCYTES NFR BLD AUTO: 6.3 % (ref 21–51)
MCH RBC QN AUTO: 29.7 PG (ref 27–31)
MCV RBC AUTO: 93.5 FL (ref 78–98)
MONOCYTES # BLD AUTO: 0.7 THOU/UL (ref 0.11–0.59)
MONOCYTES NFR BLD AUTO: 9.5 % (ref 0–10)
NEUTROPHILS # BLD AUTO: 5.9 THOU/UL (ref 1.4–6.5)
NEUTROPHILS NFR BLD AUTO: 83.1 % (ref 42–75)
PLATELET # BLD AUTO: 160 THOU/UL (ref 130–400)
POTASSIUM SERPL-SCNC: 4.9 MMOL/L (ref 3.5–5.1)
PROT UR STRIP.AUTO-MCNC: 100 MG/DL
RBC # BLD AUTO: 4.37 MILL/UL (ref 4.7–6.1)
RBC UR QL AUTO: (no result) HPF (ref 0–3)
SODIUM SERPL-SCNC: 139 MMOL/L (ref 136–145)
TROPONIN I SERPL DL<=0.01 NG/ML-MCNC: 0.06 NG/ML (ref ?–0.03)
WBC # BLD AUTO: 7.1 THOU/UL (ref 4.8–10.8)
WBC UR QL AUTO: (no result) HPF (ref 0–3)

## 2020-03-03 PROCEDURE — 36415 COLL VENOUS BLD VENIPUNCTURE: CPT

## 2020-03-03 PROCEDURE — 87086 URINE CULTURE/COLONY COUNT: CPT

## 2020-03-03 PROCEDURE — 81003 URINALYSIS AUTO W/O SCOPE: CPT

## 2020-03-03 PROCEDURE — 84484 ASSAY OF TROPONIN QUANT: CPT

## 2020-03-03 PROCEDURE — 96360 HYDRATION IV INFUSION INIT: CPT

## 2020-03-03 PROCEDURE — 96365 THER/PROPH/DIAG IV INF INIT: CPT

## 2020-03-03 PROCEDURE — 96361 HYDRATE IV INFUSION ADD-ON: CPT

## 2020-03-03 PROCEDURE — 81015 MICROSCOPIC EXAM OF URINE: CPT

## 2020-03-03 PROCEDURE — 87804 INFLUENZA ASSAY W/OPTIC: CPT

## 2020-03-03 PROCEDURE — 83880 ASSAY OF NATRIURETIC PEPTIDE: CPT

## 2020-03-03 PROCEDURE — 96375 TX/PRO/DX INJ NEW DRUG ADDON: CPT

## 2020-03-03 PROCEDURE — 80053 COMPREHEN METABOLIC PANEL: CPT

## 2020-03-03 PROCEDURE — 83605 ASSAY OF LACTIC ACID: CPT

## 2020-03-03 PROCEDURE — 82553 CREATINE MB FRACTION: CPT

## 2020-03-03 PROCEDURE — 85025 COMPLETE CBC W/AUTO DIFF WBC: CPT

## 2020-03-03 PROCEDURE — 71045 X-RAY EXAM CHEST 1 VIEW: CPT

## 2020-03-03 PROCEDURE — 70450 CT HEAD/BRAIN W/O DYE: CPT

## 2020-03-03 PROCEDURE — 51701 INSERT BLADDER CATHETER: CPT

## 2020-03-03 PROCEDURE — 87040 BLOOD CULTURE FOR BACTERIA: CPT

## 2020-03-03 PROCEDURE — 99283 EMERGENCY DEPT VISIT LOW MDM: CPT

## 2020-03-03 NOTE — CT
CT BRAIN WITHOUT CONTRAST:

3/3/20

 

HISTORY: 

Fall. 

 

FINDINGS: 

There is an old infarction in the right frontal lobe. There are changes of chronic small vessel ische
rey disease in the periventricular white matter. The ventricular size is appropriate and the basilar 
cisterns patent. Dural calcifications are present. 

 

No evidence of acute hemorrhage, midline shift, or abnormal extra-axial fluid collections are noted. 
The bony calvarium is intact. The visualized paranasal sinuses and mastoid air cells are well aerated
.

 

IMPRESSION: 

No CT evidence of acute intracranial process. 

 

POS: [facility]

## 2020-03-03 NOTE — RAD
XR Hip Rt 2-3 View



INDICATION: Right hip pain after fall



COMPARISON: None



FINDINGS:



Bones: No acute osseous abnormality. Bone mineralization appears within normal limits.



Hip joint: There is mild degenerative arthrosis of the right hip



SI joints and symphysis pubis: There is mild degenerative change of the right SI joint and symphysis 
pubis



Intrapelvic contents: Visualized bowel gas pattern is within normal limits.



Surrounding soft tissues: There is mild vascular calcification involving the right hip soft tissues



IMPRESSION:

1. No acute osseous abnormality. 



Reported By: Kamran Gongora 

Electronically Signed:  3/3/2020 3:30 PM

## 2020-03-03 NOTE — RAD
CHEST ONE VIEW:

3/3/20

 

HISTORY: 

Fever and cough. 

 

COMPARISON: 

2/11/18

 

FINDINGS: 

Heart size is enlarged. Lungs are hypoinflated. There is vascular crowding. Atelectatic changes of arsenio
th lung bases. 

 

AICD/pacer is similar. Multiple midline sternotomy wires. 

 

IMPRESSION: 

Similar exam. No evidence for pneumonia. 

 

POS: [facility]

## 2020-03-11 ENCOUNTER — APPOINTMENT (RX ONLY)
Dept: URBAN - METROPOLITAN AREA CLINIC 91 | Facility: CLINIC | Age: 75
Setting detail: DERMATOLOGY
End: 2020-03-11

## 2020-03-11 DIAGNOSIS — L71.8 OTHER ROSACEA: ICD-10-CM

## 2020-03-11 DIAGNOSIS — B37.2 CANDIDIASIS OF SKIN AND NAIL: ICD-10-CM

## 2020-03-11 PROBLEM — D23.9 OTHER BENIGN NEOPLASM OF SKIN, UNSPECIFIED: Status: ACTIVE | Noted: 2020-03-11

## 2020-03-11 PROCEDURE — ? PRESCRIPTION

## 2020-03-11 PROCEDURE — ? COUNSELING

## 2020-03-11 PROCEDURE — 99203 OFFICE O/P NEW LOW 30 MIN: CPT

## 2020-03-11 RX ORDER — METRONIDAZOLE 7.5 MG/G
GEL TOPICAL BID
Qty: 1 | Refills: 5 | Status: ERX | COMMUNITY
Start: 2020-03-11

## 2020-03-11 RX ORDER — NYSTATIN 100000 [USP'U]/G
CREAM TOPICAL
Qty: 1 | Refills: 1 | Status: ERX | COMMUNITY
Start: 2020-03-11

## 2020-03-11 RX ADMIN — METRONIDAZOLE APPLY: 7.5 GEL TOPICAL at 00:00

## 2020-03-11 RX ADMIN — NYSTATIN APPLY: 100000 CREAM TOPICAL at 00:00

## 2020-03-11 ASSESSMENT — LOCATION DETAILED DESCRIPTION DERM: LOCATION DETAILED: LEFT ANTERIOR PROXIMAL THIGH

## 2020-03-11 ASSESSMENT — LOCATION ZONE DERM: LOCATION ZONE: LEG

## 2020-03-11 ASSESSMENT — LOCATION SIMPLE DESCRIPTION DERM: LOCATION SIMPLE: LEFT THIGH

## 2020-03-11 NOTE — HPI: RASH
How Severe Is Your Rash?: mild
Is This A New Presentation, Or A Follow-Up?: Rash
Additional History: Previously treated with clotrimazole/betamethasone with no relief

## 2020-04-21 ENCOUNTER — HOSPITAL ENCOUNTER (OUTPATIENT)
Dept: HOSPITAL 18 - NAV RAD | Age: 75
Discharge: HOME | End: 2020-04-21
Attending: FAMILY MEDICINE
Payer: MEDICARE

## 2020-04-21 DIAGNOSIS — M54.5: Primary | ICD-10-CM

## 2020-04-21 PROCEDURE — 72100 X-RAY EXAM L-S SPINE 2/3 VWS: CPT

## 2020-04-21 NOTE — RAD
THREE VIEWS OF THE LUMBAR SPINE:

4/21/20

 

COMPARISON: 

None.

 

HISTORY: 

Low back pain.

 

FINDINGS: 

Pedicles appear intact on the frontal imaging. The lateral exam is limited by rotation. No obvious fr
acture. No anterolisthesis or retrolisthesis. There is atherosclerotic calcification of the  abdomina
l aorta. 

 

IMPRESSION: 

No acute osseous abnormality. Please note that the study is significantly limited secondary to rotati
on on the lateral view. 

 

POS: [facility]

## 2020-04-22 ENCOUNTER — RX ONLY (OUTPATIENT)
Age: 75
Setting detail: RX ONLY
End: 2020-04-22

## 2020-04-22 RX ORDER — KETOCONAZOLE 20 MG/G
CREAM TOPICAL
Qty: 1 | Refills: 3 | Status: ERX | COMMUNITY
Start: 2020-04-22

## 2020-11-10 ENCOUNTER — HOSPITAL ENCOUNTER (INPATIENT)
Dept: HOSPITAL 92 - ERS | Age: 75
LOS: 2 days | Discharge: HOME HEALTH | DRG: 177 | End: 2020-11-12
Attending: HOSPITALIST | Admitting: STUDENT IN AN ORGANIZED HEALTH CARE EDUCATION/TRAINING PROGRAM
Payer: MEDICARE

## 2020-11-10 VITALS — BODY MASS INDEX: 28.3 KG/M2

## 2020-11-10 DIAGNOSIS — E11.42: ICD-10-CM

## 2020-11-10 DIAGNOSIS — F41.9: ICD-10-CM

## 2020-11-10 DIAGNOSIS — E46: ICD-10-CM

## 2020-11-10 DIAGNOSIS — Z95.5: ICD-10-CM

## 2020-11-10 DIAGNOSIS — I25.2: ICD-10-CM

## 2020-11-10 DIAGNOSIS — Z95.1: ICD-10-CM

## 2020-11-10 DIAGNOSIS — K21.9: ICD-10-CM

## 2020-11-10 DIAGNOSIS — Z87.891: ICD-10-CM

## 2020-11-10 DIAGNOSIS — Z95.810: ICD-10-CM

## 2020-11-10 DIAGNOSIS — J12.89: ICD-10-CM

## 2020-11-10 DIAGNOSIS — E78.2: ICD-10-CM

## 2020-11-10 DIAGNOSIS — A41.9: ICD-10-CM

## 2020-11-10 DIAGNOSIS — E03.9: ICD-10-CM

## 2020-11-10 DIAGNOSIS — U07.1: Primary | ICD-10-CM

## 2020-11-10 DIAGNOSIS — Z79.82: ICD-10-CM

## 2020-11-10 DIAGNOSIS — Z79.899: ICD-10-CM

## 2020-11-10 DIAGNOSIS — Z95.828: ICD-10-CM

## 2020-11-10 DIAGNOSIS — J15.9: ICD-10-CM

## 2020-11-10 DIAGNOSIS — F32.9: ICD-10-CM

## 2020-11-10 DIAGNOSIS — I25.10: ICD-10-CM

## 2020-11-10 DIAGNOSIS — G20: ICD-10-CM

## 2020-11-10 DIAGNOSIS — Z79.84: ICD-10-CM

## 2020-11-10 DIAGNOSIS — Z79.890: ICD-10-CM

## 2020-11-10 DIAGNOSIS — J45.909: ICD-10-CM

## 2020-11-10 LAB
ALBUMIN SERPL BCG-MCNC: 3.4 G/DL (ref 3.4–4.8)
ALP SERPL-CCNC: 106 U/L (ref 40–110)
ALT SERPL W P-5'-P-CCNC: 34 U/L (ref 8–55)
ANION GAP SERPL CALC-SCNC: 18 MMOL/L (ref 10–20)
AST SERPL-CCNC: 134 U/L (ref 5–34)
BASOPHILS # BLD AUTO: 0 THOU/UL (ref 0–0.2)
BASOPHILS NFR BLD AUTO: 0.6 % (ref 0–1)
BILIRUB SERPL-MCNC: 0.5 MG/DL (ref 0.2–1.2)
BUN SERPL-MCNC: 24 MG/DL (ref 8.4–25.7)
CALCIUM SERPL-MCNC: 8.4 MG/DL (ref 7.8–10.44)
CHLORIDE SERPL-SCNC: 99 MMOL/L (ref 98–107)
CO2 SERPL-SCNC: 21 MMOL/L (ref 23–31)
CREAT CL PREDICTED SERPL C-G-VRATE: 0 ML/MIN (ref 70–130)
EOSINOPHIL # BLD AUTO: 0 THOU/UL (ref 0–0.7)
EOSINOPHIL NFR BLD AUTO: 0.2 % (ref 0–10)
GLOBULIN SER CALC-MCNC: 3.8 G/DL (ref 2.4–3.5)
GLUCOSE SERPL-MCNC: 188 MG/DL (ref 83–110)
HGB BLD-MCNC: 13.7 G/DL (ref 14–18)
LYMPHOCYTES # BLD: 0.8 THOU/UL (ref 1.2–3.4)
LYMPHOCYTES NFR BLD AUTO: 13 % (ref 21–51)
MCH RBC QN AUTO: 30.1 PG (ref 27–31)
MCV RBC AUTO: 93.8 FL (ref 78–98)
MONOCYTES # BLD AUTO: 0.4 THOU/UL (ref 0.11–0.59)
MONOCYTES NFR BLD AUTO: 7.5 % (ref 0–10)
NEUTROPHILS # BLD AUTO: 4.6 THOU/UL (ref 1.4–6.5)
NEUTROPHILS NFR BLD AUTO: 78.7 % (ref 42–75)
PLATELET # BLD AUTO: 165 THOU/UL (ref 130–400)
POTASSIUM SERPL-SCNC: 4 MMOL/L (ref 3.5–5.1)
PROT UR STRIP.AUTO-MCNC: 50 MG/DL
RBC # BLD AUTO: 4.55 MILL/UL (ref 4.7–6.1)
RBC UR QL AUTO: (no result) HPF (ref 0–3)
SODIUM SERPL-SCNC: 134 MMOL/L (ref 136–145)
SP GR UR STRIP: 1.01 (ref 1–1.04)
WBC # BLD AUTO: 5.9 THOU/UL (ref 4.8–10.8)
WBC UR QL AUTO: (no result) HPF (ref 0–3)

## 2020-11-10 PROCEDURE — 85379 FIBRIN DEGRADATION QUANT: CPT

## 2020-11-10 PROCEDURE — 71045 X-RAY EXAM CHEST 1 VIEW: CPT

## 2020-11-10 PROCEDURE — 81003 URINALYSIS AUTO W/O SCOPE: CPT

## 2020-11-10 PROCEDURE — 80053 COMPREHEN METABOLIC PANEL: CPT

## 2020-11-10 PROCEDURE — 96374 THER/PROPH/DIAG INJ IV PUSH: CPT

## 2020-11-10 PROCEDURE — 85025 COMPLETE CBC W/AUTO DIFF WBC: CPT

## 2020-11-10 PROCEDURE — 94664 DEMO&/EVAL PT USE INHALER: CPT

## 2020-11-10 PROCEDURE — 83615 LACTATE (LD) (LDH) ENZYME: CPT

## 2020-11-10 PROCEDURE — 81015 MICROSCOPIC EXAM OF URINE: CPT

## 2020-11-10 PROCEDURE — 36415 COLL VENOUS BLD VENIPUNCTURE: CPT

## 2020-11-10 PROCEDURE — 80048 BASIC METABOLIC PNL TOTAL CA: CPT

## 2020-11-10 PROCEDURE — 84484 ASSAY OF TROPONIN QUANT: CPT

## 2020-11-10 PROCEDURE — 83605 ASSAY OF LACTIC ACID: CPT

## 2020-11-10 PROCEDURE — 86140 C-REACTIVE PROTEIN: CPT

## 2020-11-10 PROCEDURE — 36416 COLLJ CAPILLARY BLOOD SPEC: CPT

## 2020-11-10 PROCEDURE — 93005 ELECTROCARDIOGRAM TRACING: CPT

## 2020-11-10 RX ADMIN — CEFTRIAXONE SCH MLS: 1 INJECTION, POWDER, FOR SOLUTION INTRAMUSCULAR; INTRAVENOUS at 21:43

## 2020-11-10 RX ADMIN — AZITHROMYCIN SCH MLS: 500 INJECTION, POWDER, LYOPHILIZED, FOR SOLUTION INTRAVENOUS at 21:44

## 2020-11-11 LAB
ANION GAP SERPL CALC-SCNC: 11 MMOL/L (ref 10–20)
BASOPHILS # BLD AUTO: 0 THOU/UL (ref 0–0.2)
BASOPHILS NFR BLD AUTO: 0 % (ref 0–1)
BUN SERPL-MCNC: 21 MG/DL (ref 8.4–25.7)
CALCIUM SERPL-MCNC: 8.1 MG/DL (ref 7.8–10.44)
CHLORIDE SERPL-SCNC: 104 MMOL/L (ref 98–107)
CO2 SERPL-SCNC: 25 MMOL/L (ref 23–31)
CREAT CL PREDICTED SERPL C-G-VRATE: 99 ML/MIN (ref 70–130)
EOSINOPHIL # BLD AUTO: 0 THOU/UL (ref 0–0.7)
EOSINOPHIL NFR BLD AUTO: 0.3 % (ref 0–10)
GLUCOSE SERPL-MCNC: 153 MG/DL (ref 83–110)
HGB BLD-MCNC: 12.3 G/DL (ref 14–18)
LYMPHOCYTES # BLD: 0.8 THOU/UL (ref 1.2–3.4)
LYMPHOCYTES NFR BLD AUTO: 21.5 % (ref 21–51)
MCH RBC QN AUTO: 29.3 PG (ref 27–31)
MCV RBC AUTO: 94.7 FL (ref 78–98)
MONOCYTES # BLD AUTO: 0.5 THOU/UL (ref 0.11–0.59)
MONOCYTES NFR BLD AUTO: 12.8 % (ref 0–10)
NEUTROPHILS # BLD AUTO: 2.4 THOU/UL (ref 1.4–6.5)
NEUTROPHILS NFR BLD AUTO: 65.3 % (ref 42–75)
PLATELET # BLD AUTO: 144 THOU/UL (ref 130–400)
POTASSIUM SERPL-SCNC: 3.9 MMOL/L (ref 3.5–5.1)
RBC # BLD AUTO: 4.21 MILL/UL (ref 4.7–6.1)
SODIUM SERPL-SCNC: 136 MMOL/L (ref 136–145)
WBC # BLD AUTO: 3.7 THOU/UL (ref 4.8–10.8)

## 2020-11-11 PROCEDURE — 8E0ZXY6 ISOLATION: ICD-10-PCS | Performed by: STUDENT IN AN ORGANIZED HEALTH CARE EDUCATION/TRAINING PROGRAM

## 2020-11-11 RX ADMIN — AZITHROMYCIN SCH MLS: 500 INJECTION, POWDER, LYOPHILIZED, FOR SOLUTION INTRAVENOUS at 20:53

## 2020-11-11 RX ADMIN — INSULIN LISPRO PRN UNIT: 100 INJECTION, SOLUTION INTRAVENOUS; SUBCUTANEOUS at 16:59

## 2020-11-11 RX ADMIN — ALOGLIPTIN SCH MG: 6.25 TABLET, FILM COATED ORAL at 17:01

## 2020-11-11 RX ADMIN — MOMETASONE FUROATE AND FORMOTEROL FUMARATE DIHYDRATE SCH PUFF: 100; 5 AEROSOL RESPIRATORY (INHALATION) at 18:39

## 2020-11-11 RX ADMIN — INSULIN LISPRO PRN UNIT: 100 INJECTION, SOLUTION INTRAVENOUS; SUBCUTANEOUS at 13:39

## 2020-11-11 RX ADMIN — Medication SCH ML: at 20:53

## 2020-11-11 RX ADMIN — CEFTRIAXONE SCH MLS: 1 INJECTION, POWDER, FOR SOLUTION INTRAMUSCULAR; INTRAVENOUS at 20:00

## 2020-11-11 NOTE — PDOC.HOSPP
- Subjective


Encounter Date: 11/11/20


Encounter Time: 11:45


Subjective: 


Patient up in bed states he feels much better.  Patient walked by physical 

therapy felt better.





- Objective


Vital Signs & Weight: 


                             Vital Signs (12 hours)











  Temp Pulse Resp BP Pulse Ox Pulse Ox Pulse Ox


 


 11/11/20 14:31       100  100


 


 11/11/20 12:30  97.7 F  90  20  109/74  100  


 


 11/11/20 09:03  97.9 F  83  20  112/75  99  


 


 11/11/20 09:00      99  








                                     Weight











Admit Weight                   197 lb 1.6 oz


 


Weight                         197 lb 1.6 oz














I&O: 


                                        











 11/10/20 11/11/20 11/12/20





 06:59 06:59 06:59


 


Intake Total  700 1070


 


Balance  700 1070











Result Diagrams: 


                                 11/11/20 06:23





                                 11/11/20 06:23


Additional Labs: 


                                   Accuchecks











  11/11/20 11/11/20 11/11/20





  16:42 12:10 04:24


 


POC Glucose  245 H  156 H  156 H














Hospitalist ROS





- Review of Systems


Cardiovascular: denies: chest pain, palpitations, orthopnea, paroxysmal noc. 

dyspnea, edema, light headedness, other


Gastrointestinal: denies: nausea, vomiting, abdominal pain, diarrhea, 

constipation, melena, hematochezia, other


Genitourinary: denies: dysuria, frequency, incontinence, hematuria, retention, 

other





- Medication


Medications: 


Active Medications











Generic Name Dose Route Start Last Admin





  Trade Name Freq  PRN Reason Stop Dose Admin


 


Alogliptin Benzoate  12.5 mg  11/11/20 17:00  11/11/20 17:01





  Alogliptin 6.25 Mg Tab  PO   12.5 mg





  BID- CIRA   Administration


 


Enoxaparin Sodium  40 mg  11/11/20 09:00  11/11/20 08:44





  Enoxaparin Sodium 40 Mg/0.4 Ml Syringe  SC   40 mg





  0900 CIRA   Administration


 


Famotidine  20 mg  11/10/20 21:00  11/11/20 08:45





  Famotidine 20 Mg Tab  PO   20 mg





  BID CIRA   Administration


 


Ceftriaxone Sodium 1 gm/  100 mls @ 200 mls/hr  11/10/20 21:00  11/10/20 21:43





  Sodium Chloride  IVPB   100 mls





  2100 CIRA   Administration


 


Azithromycin 500 mg/ Sodium  250 mls @ 250 mls/hr  11/10/20 22:00  11/10/20 

21:44





  Chloride  IVPB   250 mls





  2200 CIRA   Administration


 


Insulin Human Lispro  0 units  11/11/20 00:55  11/11/20 16:59





  Humalog 300 Units/3 Ml Vial  SC   3 unit





  .MILD SLIDING SCALE PRN   Administration





  Mild Correctional Scale  


 


Metformin HCl  500 mg  11/11/20 17:00  11/11/20 17:01





  Metformin 500 Mg Tab  PO   500 mg





  BID-WM CIRA   Administration














- Exam


Heart: negative: RRR, no murmur, no gallops, no rubs, normal peripheral pulses, 

irregular, diminshed peripheral pulses, murmur present, II/IV, III/IV


Respiratory: negative: CTAB, no wheezes, no rales, no ronchi, normal chest 

expansion, no tachypnea, normal percussion, rales, rhonchi, tachypneic, wheezes


Gastrointestinal: negative: soft, non-tender, non-distended, normal bowel 

sounds, no palpable masses, no hepatomegaly, no splenomegaly, no bruit, no 

guarding, no rigidity, tender to palpation, distended, diminished bowl sounds, 

voluntary guarding


Extremities: 1+ LE edema





Hosp A/P


(1) Sepsis


Code(s): A41.9 - SEPSIS, UNSPECIFIED ORGANISM   Status: Acute   





(2) COVID-19


Code(s): U07.1 - COVID-19   Status: Acute   





(3) Depression


Code(s): F32.9 - MAJOR DEPRESSIVE DISORDER, SINGLE EPISODE, UNSPECIFIED   

Status: Chronic   





(4) Hypothyroidism


Code(s): E03.9 - HYPOTHYROIDISM, UNSPECIFIED   Status: Chronic   





(5) Diabetes mellitus


Code(s): E11.9 - TYPE 2 DIABETES MELLITUS WITHOUT COMPLICATIONS   Status: Acute 

 





- Plan





Patient feels much better today.  He worked well with physical therapy.  We will

continue antibiotics.  He is on room air will stop Decadron.  Patient is trying 

to eat.  Possible discharge tomorrow.  Resume home medications.

## 2020-11-11 NOTE — HP
CHIEF COMPLAINT:  Shortness of breath.



HISTORY OF PRESENT ILLNESS:  The patient is a 75-year-old male, who comes into the

hospital with shortness of breath.  Again, the patient is really unable to provide

me a very good history.  Per records is noted that the patient was positive for

COVID on November 2nd.  He has been noted to complain of difficulty breathing,

weakness, and did not want to eat very much.  The patient in the ER was noted to be

100% on room air.  However, clinically the patient has been declining. 



ALLERGIES:  NO KNOWN DRUG ALLERGIES.



MEDICATIONS:  There are no medications that are noted.



REVIEW OF SYSTEMS:  Unable to obtain.



PAST MEDICAL HISTORY:  Has a history of chronic diarrhea for 5 years, CAD, MI, PCI

stent, diabetes, hypothyroidism, diverticulosis, GERD, dyslipidemia, hypertension,

and asthma. 



PAST SURGICAL HISTORY:  He has had a coronary artery bypass.  Has a 4-vessel AICD

placement and stent placement. 



SOCIAL HISTORY:  He denies any tobacco use or recreational drug use.  He has

occasional alcohol use.  This is all per previous history.  Patient currently is not

capable of providing me a detailed history. 



CODE STATUS:  Unable to discuss with this patient.



FAMILY HISTORY:  Per records, no history of heart disease or stroke.



ALLERGIES:  AS I MENTIONED, NO KNOWN DRUG ALLERGIES.



MEDICATIONS:  Per history appears to be;

1. Levothyroxine 75 mcg daily.

2. Lisinopril 2.5 mg twice a day.

3. Zantac 150 mg twice a day.

4. Magnesium oxide 400 mg daily.

5. Janumet 1.5 tabs twice a day.

6. Spironolactone 25 mg daily.

7. Torsemide 10 mg daily.

8. Atorvastatin 20 mg daily.

9. Aspirin 81 mg daily.



PHYSICAL EXAMINATION:

VITAL SIGNS:  As of the following; temperature of 98.7, 99% on room air,

respirations 19, blood pressure 160/87, heart rate 82. 

GENERAL:  He is awake, oriented to self and place. 

CV:  S1, S2 present.  No murmurs, rubs, or gallops. 

LUNGS:  Clear to auscultation.  No rhonchi or wheezes noted. 

ABDOMEN:  Soft and nontender.  Bowel sounds are present x2. 

EXTREMITIES:  He does have 1 to 2+ lower extremity pitting edema. 

NEUROVASCULAR:  No focal deficits noted.  He is able to move bilateral lower

extremities and bilateral upper extremities. 

SKIN:  No cuts, lesions, or bruises noted.



LABORATORY RESULTS:  As of the following; WBCs of 5.9, hemoglobin of 13.7,

hematocrit of 42.7, platelets of 165.  Chemistry; sodium 134, potassium of 4.0, BUN

of 24, creatinine 1.15, glucose of 188.  His lactic acid is 2.7.  His AST is 134,

ALT is 34, alkaline phosphatase is 106.  His CRP is 14.75.  His troponin x1 was

negative.  He did have a chest x-ray done, which indicated scattered bilateral mid

to moderate ground-glass infiltrates, evidence of bilateral COVID pneumonia,

questionable interval worsening of region in the right lateral lower lung versus

apparent differences due to positional differences.  Recommend to follow up.

Compared to his prior chest x-ray, which was done on 11/02, which just indicated

bilateral ground-glass infiltrates, COVID pneumonia, the patient did have some

mildly elevated lactic acid.  I will start him on also prophylactic antibiotics.  He

does have some abnormalities noted in the x-ray.  However, on the blood work, he

does not have appear to have elevated WBCs, however, he does have elevated

neutrophils.  I will start him on some steroids.  Also, his CRP is significantly

elevated at 14.  I do not have a baseline CRP.  We will continue to trend. 



ASSESSMENT:  

1. Generalized weakness, malnutrition.  Patient has not been really eating very

much, most likely secondary to COVID pneumonia.  We will start him on Ensure.  We

will also get PT to evaluate this patient. 

2. History of diabetes.  We will put the patient on sliding scale insulin and

continue to monitor the patient. 

3. Coronary artery disease.  We will continue home medications.

4. Deep venous thrombosis prophylaxis.  We will put patient on subcu heparin or

Lovenox. 







Job ID:  [id number]

## 2020-11-12 VITALS — SYSTOLIC BLOOD PRESSURE: 126 MMHG | TEMPERATURE: 97.8 F | DIASTOLIC BLOOD PRESSURE: 78 MMHG

## 2020-11-12 LAB
ALBUMIN SERPL BCG-MCNC: 3.2 G/DL (ref 3.4–4.8)
ALP SERPL-CCNC: 104 U/L (ref 40–110)
ALT SERPL W P-5'-P-CCNC: 88 U/L (ref 8–55)
ANION GAP SERPL CALC-SCNC: 13 MMOL/L (ref 10–20)
AST SERPL-CCNC: 99 U/L (ref 5–34)
BILIRUB SERPL-MCNC: 0.6 MG/DL (ref 0.2–1.2)
BUN SERPL-MCNC: 20 MG/DL (ref 8.4–25.7)
CALCIUM SERPL-MCNC: 8.6 MG/DL (ref 7.8–10.44)
CHLORIDE SERPL-SCNC: 102 MMOL/L (ref 98–107)
CO2 SERPL-SCNC: 26 MMOL/L (ref 23–31)
CREAT CL PREDICTED SERPL C-G-VRATE: 80 ML/MIN (ref 70–130)
CRP SERPL-MCNC: 10.25 MG/DL
GLOBULIN SER CALC-MCNC: 3.5 G/DL (ref 2.4–3.5)
GLUCOSE SERPL-MCNC: 159 MG/DL (ref 83–110)
POTASSIUM SERPL-SCNC: 3.9 MMOL/L (ref 3.5–5.1)
SODIUM SERPL-SCNC: 137 MMOL/L (ref 136–145)

## 2020-11-12 RX ADMIN — Medication SCH ML: at 08:18

## 2020-11-12 RX ADMIN — MOMETASONE FUROATE AND FORMOTEROL FUMARATE DIHYDRATE SCH PUFF: 100; 5 AEROSOL RESPIRATORY (INHALATION) at 06:36

## 2020-11-12 RX ADMIN — ALOGLIPTIN SCH MG: 6.25 TABLET, FILM COATED ORAL at 08:12

## 2020-11-12 NOTE — DIS
DATE OF ADMISSION:  11/10/2020



DATE OF DISCHARGE:  11/12/2020



DISCHARGE DIAGNOSES:  

1. Generalized weakness.

2. Shortness of breath.

3. COVID pneumonia.

4. Possible bacterial pneumonia.

5. Diabetes.

6. Hypothyroidism.



HOSPITAL COURSE:  The patient is a pleasant 75-year-old male who initially presented

to the hospital with shortness of breath; however, he was never hypoxic.  He was

noted to be 99% and never required any oxygen while he was in the hospital.  He was

noted to have significant weakness.  At this time, the patient had a chest x-ray,

which showed possible concerning pneumonia noted to the right lower lateral lung

zone.  At this time, he was started on antibiotics.  His inflammatory markers were

checked, which were trending down.  Since he was not hypoxic, he was not a candidate

for steroids.  The patient worked with physical therapy.  He has been getting up and

ambulating.  He has been tolerating his diet well. 



He will be discharged to home.  He will do home health for him.  He will follow up

with his primary care provider. 



The patient has been advised to check his oxygen at home.  Also, I have told him

that to watch his blood pressure because his blood pressure here has been a little

bit on the lower side. 



HOME MEDICATIONS:  

1. Coreg 12.5 b.i.d. instead of 25 mg b.i.d.

2. Levaquin 500 mg daily.

3. Florastor 250 mg daily.

4. Lisinopril 2.5 b.i.d.

5. Aspirin 81 mg daily.

6. Levothyroxine 75 mcg daily.

7. Furosemide 20 mg daily.

8. Potassium 10 mEq daily.

9. Clopidogrel 37.5 daily.

10. Janumet one tab b.i.d.

11. Sertraline 200 mg daily.

12. Spironolactone 25 mg as needed.

13. Rosuvastatin 20 mg daily.

14. Albuterol as needed.



DISCHARGE PHYSICAL EXAMINATION:  VITAL SIGNS:  On discharge, temperature of 98.4,

pulse 80, respirations 18, O2 sat is 95% on room air, and blood pressure 109/71. 

GENERAL:  He is awake, alert, and oriented x3.  He is in no pain distress. 

CV:  S1, S2 present.  No murmurs, rubs, or gallops. 

ABDOMEN:  Soft, nontender.  Bowel sounds are present x2. 



Again, he will be discharged home.  He will follow up with his primary.







Job ID:  [id number]

## 2020-11-16 NOTE — PQF
CLINICAL DOCUMENTATION CLARIFICATION FORM:

Dear : Missy Keith          Date / Time: 11/16/2020 0043

Please exercise your independent, professional judgment in responding to the 
clarification form. 

Clinical indicators are provided on the bottom of this form for your review



Based on the clinical indicators on admit, can you determine if Sepsis was 
present at the time of admission?

Diagnosis: Sepsis

Present on Admission (POA):  [  ] Yes             [  x] No             [  ] 
Unable to determine



Physician Signature:                Date/Time:



For continuity of documentation, please document condition throughout progress 
notes and discharge summary.  Thank You.

To be completed by CDI/Coding staff for physician review: 







 Present Clinical Indicators - Signs / Symptoms / Labs Results and Location in 

Medical Record

 

[X] WBC 5.9; 3.7, Plt count 165, Neutrophils 78.8, Lactic acid 2.7; 2.3 
Laboratory 11/10

 

[X] Chest X-ray Impression: Evidence for bilateral Covid 19 Pneumonia Imaging Dr Chauhan 11/10

 

[X] /87, Pulse 82, Resp 21, Temp 98.7 Vital signs 11/10

 

[X] Per records is noted the pt was positive for Covid on Nov2nd H&P p1 11/10 
Dr Keith

 

[X] Covid Pneumonia H&P p2 11/10 Dr Keith

 

[X] Sepsis HPN p4 11/11 Dr Keith

 

Present Risk Factors                                                            
              Results and Location in Medical Record

 

[X] 75 year-old Male H&P p1 11/10 Dr Keith

 

[X] DM H&P p1 11/10 Dr Keith

 

[X] HTN H&P p1 11/10 Dr Keith

 

[X] CAD H&P p1 11/10 Dr Keith

 

[X] Malnutrition H&P p1 11/10 Dr Keith

 

[X] Covid Pneumonia H&P p2 11/10 Dr Keith

 

Present Treatments                                                              
                Results and Location in Medical Record

 

[X] IVF NS 1L MAR 11/10

 

[X] IV Ceftriaxone 1 gm MAR 11/10

 

[X] IV Azithromycin 500 mg MAR 11/10

 

[X] Chest X-ray Imaging Dr Chauhan 11/10





  CDS/ Signature: Blanquita Bonner      Phone #: [phone] ext [phone]    Date/Time: 11/16/2020 0043



                 This is a permanent part of the Medical Record

[facility]

## 2020-11-21 NOTE — EKG
Test Reason : 

Blood Pressure : ***/*** mmHG

Vent. Rate : 093 BPM     Atrial Rate : 093 BPM

   P-R Int : 180 ms          QRS Dur : 106 ms

    QT Int : 392 ms       P-R-T Axes : 052 -37 094 degrees

   QTc Int : 487 ms

 

Normal sinus rhythm

Left axis deviation



Abnormal ECG

 

Confirmed by MICHAEL CARRENO ([id number]),  GURU LANGFORD ([id number]) on 11/21/2020 4:01:14 PM

 

Referred By:             Confirmed By:MICHAEL CARRENO

## 2021-05-12 NOTE — RAD
PORTABLE AP CHEST RADIOGRAPH:

 

History: Generalized weakness, dyspnea. Upset stomach for several weeks. 

 

Comparison: 8-31-10

 

FINDINGS: 

Dual-lead left subclavian AICD device remains in place. There have been interval post-surgical change
s with evidence of CABG on today's exam. Cardiac silhouette is magnified by projection. Pulmonary vas
culature also is accentuated by the depth of inspiration and portable technique. There is mild elevat
ion of the right hemidiaphragm. Lungs are otherwise clear. There is mild widening of the right acromi
oclavicular joint space and distal right clavicle is slightly elevated superiorly. This could be rela
darryl to acromioclavicular injury of indeterminate age. 

 

IMPRESSION: 

1. No acute cardiopulmonary process. 

2. Mild elevation of the right hemidiaphragm. 

3. Findings likely related to acromioclavicular joint separation of unknown time frame. 

 

POS: [facility] ambulatory

## 2021-06-07 ENCOUNTER — HOSPITAL ENCOUNTER (EMERGENCY)
Dept: HOSPITAL 92 - ERS | Age: 76
Discharge: HOME | End: 2021-06-07
Payer: MEDICARE

## 2021-06-07 DIAGNOSIS — E11.9: ICD-10-CM

## 2021-06-07 DIAGNOSIS — E03.9: ICD-10-CM

## 2021-06-07 DIAGNOSIS — Z87.891: ICD-10-CM

## 2021-06-07 DIAGNOSIS — K21.9: ICD-10-CM

## 2021-06-07 DIAGNOSIS — I25.2: ICD-10-CM
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
## 2021-06-07 DIAGNOSIS — I10: ICD-10-CM

## 2021-06-07 DIAGNOSIS — E78.5: ICD-10-CM

## 2021-06-07 DIAGNOSIS — S01.01XA: Primary | ICD-10-CM

## 2021-06-07 DIAGNOSIS — Z79.899: ICD-10-CM

## 2021-06-07 PROCEDURE — 93005 ELECTROCARDIOGRAM TRACING: CPT

## 2021-06-07 PROCEDURE — 70450 CT HEAD/BRAIN W/O DYE: CPT

## 2021-06-09 ENCOUNTER — HOSPITAL ENCOUNTER (INPATIENT)
Dept: HOSPITAL 92 - ERS | Age: 76
LOS: 7 days | Discharge: NURSING, REHAB OR LONG-TERM CARE | DRG: 291 | End: 2021-06-16
Attending: FAMILY MEDICINE | Admitting: FAMILY MEDICINE
Payer: MEDICARE

## 2021-06-09 VITALS — BODY MASS INDEX: 26.4 KG/M2

## 2021-06-09 DIAGNOSIS — G20: ICD-10-CM

## 2021-06-09 DIAGNOSIS — E78.5: ICD-10-CM

## 2021-06-09 DIAGNOSIS — Z79.84: ICD-10-CM

## 2021-06-09 DIAGNOSIS — I27.82: ICD-10-CM

## 2021-06-09 DIAGNOSIS — T38.3X5A: ICD-10-CM

## 2021-06-09 DIAGNOSIS — I25.5: ICD-10-CM

## 2021-06-09 DIAGNOSIS — Z79.02: ICD-10-CM

## 2021-06-09 DIAGNOSIS — Z79.899: ICD-10-CM

## 2021-06-09 DIAGNOSIS — I11.0: Primary | ICD-10-CM

## 2021-06-09 DIAGNOSIS — Z87.891: ICD-10-CM

## 2021-06-09 DIAGNOSIS — E03.9: ICD-10-CM

## 2021-06-09 DIAGNOSIS — I25.10: ICD-10-CM

## 2021-06-09 DIAGNOSIS — R13.10: ICD-10-CM

## 2021-06-09 DIAGNOSIS — Z90.89: ICD-10-CM

## 2021-06-09 DIAGNOSIS — E78.00: ICD-10-CM

## 2021-06-09 DIAGNOSIS — K80.20: ICD-10-CM

## 2021-06-09 DIAGNOSIS — Z95.1: ICD-10-CM

## 2021-06-09 DIAGNOSIS — F32.9: ICD-10-CM

## 2021-06-09 DIAGNOSIS — I50.23: ICD-10-CM

## 2021-06-09 DIAGNOSIS — I47.2: ICD-10-CM

## 2021-06-09 DIAGNOSIS — R79.89: ICD-10-CM

## 2021-06-09 DIAGNOSIS — Z79.82: ICD-10-CM

## 2021-06-09 DIAGNOSIS — I26.99: ICD-10-CM

## 2021-06-09 DIAGNOSIS — Z86.16: ICD-10-CM

## 2021-06-09 DIAGNOSIS — Z95.810: ICD-10-CM

## 2021-06-09 DIAGNOSIS — J45.909: ICD-10-CM

## 2021-06-09 DIAGNOSIS — I25.2: ICD-10-CM

## 2021-06-09 DIAGNOSIS — K14.0: ICD-10-CM

## 2021-06-09 DIAGNOSIS — K52.1: ICD-10-CM

## 2021-06-09 DIAGNOSIS — E11.40: ICD-10-CM

## 2021-06-09 DIAGNOSIS — K21.9: ICD-10-CM

## 2021-06-09 LAB
ALBUMIN SERPL BCG-MCNC: 3.4 G/DL (ref 3.4–4.8)
ALP SERPL-CCNC: 141 U/L (ref 40–110)
ALT SERPL W P-5'-P-CCNC: 7 U/L (ref 8–55)
ANION GAP SERPL CALC-SCNC: 15 MMOL/L (ref 10–20)
APTT PPP: 34.5 SEC (ref 22.9–36.1)
AST SERPL-CCNC: 25 U/L (ref 5–34)
BASOPHILS # BLD AUTO: 0.1 THOU/UL (ref 0–0.2)
BASOPHILS NFR BLD AUTO: 1.3 % (ref 0–1)
BILIRUB SERPL-MCNC: 1.2 MG/DL (ref 0.2–1.2)
BUN SERPL-MCNC: 21 MG/DL (ref 8.4–25.7)
CALCIUM SERPL-MCNC: 8.7 MG/DL (ref 7.8–10.44)
CHLORIDE SERPL-SCNC: 103 MMOL/L (ref 98–107)
CO2 SERPL-SCNC: 22 MMOL/L (ref 23–31)
CREAT CL PREDICTED SERPL C-G-VRATE: 0 ML/MIN (ref 70–130)
EOSINOPHIL # BLD AUTO: 0.3 THOU/UL (ref 0–0.7)
EOSINOPHIL NFR BLD AUTO: 4.1 % (ref 0–10)
GLOBULIN SER CALC-MCNC: 3.6 G/DL (ref 2.4–3.5)
GLUCOSE SERPL-MCNC: 95 MG/DL (ref 83–110)
HGB BLD-MCNC: 13.8 G/DL (ref 14–18)
INR PPP: 1.2
LYMPHOCYTES # BLD: 1.1 THOU/UL (ref 1.2–3.4)
LYMPHOCYTES NFR BLD AUTO: 16.9 % (ref 21–51)
MCH RBC QN AUTO: 29.7 PG (ref 27–31)
MCV RBC AUTO: 90.4 FL (ref 78–98)
MONOCYTES # BLD AUTO: 0.8 THOU/UL (ref 0.11–0.59)
MONOCYTES NFR BLD AUTO: 12 % (ref 0–10)
NEUTROPHILS # BLD AUTO: 4.4 THOU/UL (ref 1.4–6.5)
NEUTROPHILS NFR BLD AUTO: 65.7 % (ref 42–75)
PLATELET # BLD AUTO: 173 THOU/UL (ref 130–400)
POTASSIUM SERPL-SCNC: 3.8 MMOL/L (ref 3.5–5.1)
PROTHROMBIN TIME: 15.6 SEC (ref 12–14.7)
RBC # BLD AUTO: 4.64 MILL/UL (ref 4.7–6.1)
SODIUM SERPL-SCNC: 136 MMOL/L (ref 136–145)
WBC # BLD AUTO: 6.8 THOU/UL (ref 4.8–10.8)

## 2021-06-09 PROCEDURE — 84145 PROCALCITONIN (PCT): CPT

## 2021-06-09 PROCEDURE — 93005 ELECTROCARDIOGRAM TRACING: CPT

## 2021-06-09 PROCEDURE — 71045 X-RAY EXAM CHEST 1 VIEW: CPT

## 2021-06-09 PROCEDURE — 85610 PROTHROMBIN TIME: CPT

## 2021-06-09 PROCEDURE — 82746 ASSAY OF FOLIC ACID SERUM: CPT

## 2021-06-09 PROCEDURE — 96372 THER/PROPH/DIAG INJ SC/IM: CPT

## 2021-06-09 PROCEDURE — 83550 IRON BINDING TEST: CPT

## 2021-06-09 PROCEDURE — 83036 HEMOGLOBIN GLYCOSYLATED A1C: CPT

## 2021-06-09 PROCEDURE — 82607 VITAMIN B-12: CPT

## 2021-06-09 PROCEDURE — 80053 COMPREHEN METABOLIC PANEL: CPT

## 2021-06-09 PROCEDURE — 36415 COLL VENOUS BLD VENIPUNCTURE: CPT

## 2021-06-09 PROCEDURE — 85014 HEMATOCRIT: CPT

## 2021-06-09 PROCEDURE — 12001 RPR S/N/AX/GEN/TRNK 2.5CM/<: CPT

## 2021-06-09 PROCEDURE — 85730 THROMBOPLASTIN TIME PARTIAL: CPT

## 2021-06-09 PROCEDURE — 96374 THER/PROPH/DIAG INJ IV PUSH: CPT

## 2021-06-09 PROCEDURE — 83880 ASSAY OF NATRIURETIC PEPTIDE: CPT

## 2021-06-09 PROCEDURE — 93010 ELECTROCARDIOGRAM REPORT: CPT

## 2021-06-09 PROCEDURE — 83735 ASSAY OF MAGNESIUM: CPT

## 2021-06-09 PROCEDURE — 84443 ASSAY THYROID STIM HORMONE: CPT

## 2021-06-09 PROCEDURE — 84484 ASSAY OF TROPONIN QUANT: CPT

## 2021-06-09 PROCEDURE — 70450 CT HEAD/BRAIN W/O DYE: CPT

## 2021-06-09 PROCEDURE — 85025 COMPLETE CBC W/AUTO DIFF WBC: CPT

## 2021-06-09 PROCEDURE — 83540 ASSAY OF IRON: CPT

## 2021-06-09 PROCEDURE — 85049 AUTOMATED PLATELET COUNT: CPT

## 2021-06-09 PROCEDURE — 84100 ASSAY OF PHOSPHORUS: CPT

## 2021-06-09 PROCEDURE — 94760 N-INVAS EAR/PLS OXIMETRY 1: CPT

## 2021-06-09 PROCEDURE — 80048 BASIC METABOLIC PNL TOTAL CA: CPT

## 2021-06-09 PROCEDURE — 82728 ASSAY OF FERRITIN: CPT

## 2021-06-09 PROCEDURE — 36416 COLLJ CAPILLARY BLOOD SPEC: CPT

## 2021-06-09 PROCEDURE — 71275 CT ANGIOGRAPHY CHEST: CPT

## 2021-06-09 PROCEDURE — 83605 ASSAY OF LACTIC ACID: CPT

## 2021-06-09 PROCEDURE — 85018 HEMOGLOBIN: CPT

## 2021-06-10 LAB
ANION GAP SERPL CALC-SCNC: 14 MMOL/L (ref 10–20)
BUN SERPL-MCNC: 21 MG/DL (ref 8.4–25.7)
CALCIUM SERPL-MCNC: 8.7 MG/DL (ref 7.8–10.44)
CHLORIDE SERPL-SCNC: 102 MMOL/L (ref 98–107)
CO2 SERPL-SCNC: 27 MMOL/L (ref 23–31)
CREAT CL PREDICTED SERPL C-G-VRATE: 66 ML/MIN (ref 70–130)
FERRITIN SERPL-MCNC: 178.19 NG/ML (ref 22–322)
GLUCOSE SERPL-MCNC: 109 MG/DL (ref 83–110)
IRON SERPL-MCNC: 57 UG/DL (ref 65–175)
POTASSIUM SERPL-SCNC: 3.7 MMOL/L (ref 3.5–5.1)
SODIUM SERPL-SCNC: 139 MMOL/L (ref 136–145)
UIBC SERPL-MCNC: 200 MCG/DL (ref 261–462)
VIT B12 SERPL-MCNC: 798 PG/ML (ref 211–911)

## 2021-06-10 RX ADMIN — MOMETASONE FUROATE AND FORMOTEROL FUMARATE DIHYDRATE SCH: 100; 5 AEROSOL RESPIRATORY (INHALATION) at 18:39

## 2021-06-11 LAB
ANION GAP SERPL CALC-SCNC: 15 MMOL/L (ref 10–20)
BUN SERPL-MCNC: 18 MG/DL (ref 8.4–25.7)
CALCIUM SERPL-MCNC: 8.6 MG/DL (ref 7.8–10.44)
CHLORIDE SERPL-SCNC: 101 MMOL/L (ref 98–107)
CO2 SERPL-SCNC: 25 MMOL/L (ref 23–31)
CREAT CL PREDICTED SERPL C-G-VRATE: 67 ML/MIN (ref 70–130)
GLUCOSE SERPL-MCNC: 136 MG/DL (ref 83–110)
MAGNESIUM SERPL-MCNC: 1.9 MG/DL (ref 1.6–2.6)
POTASSIUM SERPL-SCNC: 3.4 MMOL/L (ref 3.5–5.1)
SODIUM SERPL-SCNC: 138 MMOL/L (ref 136–145)
TROPONIN I SERPL DL<=0.01 NG/ML-MCNC: 0.02 NG/ML (ref ?–0.03)

## 2021-06-11 RX ADMIN — INSULIN LISPRO PRN UNIT: 100 INJECTION, SOLUTION INTRAVENOUS; SUBCUTANEOUS at 17:51

## 2021-06-11 RX ADMIN — INSULIN LISPRO PRN UNIT: 100 INJECTION, SOLUTION INTRAVENOUS; SUBCUTANEOUS at 11:37

## 2021-06-11 RX ADMIN — ASPIRIN SCH MG: 81 TABLET ORAL at 08:48

## 2021-06-11 RX ADMIN — MOMETASONE FUROATE AND FORMOTEROL FUMARATE DIHYDRATE SCH: 100; 5 AEROSOL RESPIRATORY (INHALATION) at 07:47

## 2021-06-11 RX ADMIN — MOMETASONE FUROATE AND FORMOTEROL FUMARATE DIHYDRATE SCH PUFF: 100; 5 AEROSOL RESPIRATORY (INHALATION) at 18:55

## 2021-06-12 LAB
ANION GAP SERPL CALC-SCNC: 18 MMOL/L (ref 10–20)
BUN SERPL-MCNC: 19 MG/DL (ref 8.4–25.7)
CALCIUM SERPL-MCNC: 8.9 MG/DL (ref 7.8–10.44)
CHLORIDE SERPL-SCNC: 101 MMOL/L (ref 98–107)
CO2 SERPL-SCNC: 23 MMOL/L (ref 23–31)
CREAT CL PREDICTED SERPL C-G-VRATE: 71 ML/MIN (ref 70–130)
GLUCOSE SERPL-MCNC: 108 MG/DL (ref 83–110)
MAGNESIUM SERPL-MCNC: 1.9 MG/DL (ref 1.6–2.6)
POTASSIUM SERPL-SCNC: 3.8 MMOL/L (ref 3.5–5.1)
SODIUM SERPL-SCNC: 138 MMOL/L (ref 136–145)

## 2021-06-12 RX ADMIN — MOMETASONE FUROATE AND FORMOTEROL FUMARATE DIHYDRATE SCH: 100; 5 AEROSOL RESPIRATORY (INHALATION) at 07:01

## 2021-06-12 RX ADMIN — MOMETASONE FUROATE AND FORMOTEROL FUMARATE DIHYDRATE SCH PUFF: 100; 5 AEROSOL RESPIRATORY (INHALATION) at 18:36

## 2021-06-12 RX ADMIN — Medication SCH EACH: at 08:57

## 2021-06-12 RX ADMIN — ASPIRIN SCH MG: 81 TABLET ORAL at 08:57

## 2021-06-13 LAB
ANION GAP SERPL CALC-SCNC: 15 MMOL/L (ref 10–20)
BUN SERPL-MCNC: 18 MG/DL (ref 8.4–25.7)
CALCIUM SERPL-MCNC: 8.5 MG/DL (ref 7.8–10.44)
CHLORIDE SERPL-SCNC: 103 MMOL/L (ref 98–107)
CO2 SERPL-SCNC: 23 MMOL/L (ref 23–31)
CREAT CL PREDICTED SERPL C-G-VRATE: 76 ML/MIN (ref 70–130)
CREAT CL PREDICTED SERPL C-G-VRATE: 79 ML/MIN (ref 70–130)
GLUCOSE SERPL-MCNC: 126 MG/DL (ref 83–110)
HGB BLD-MCNC: 13.2 G/DL (ref 14–18)
PLATELET # BLD AUTO: 190 THOU/UL (ref 130–400)
POTASSIUM SERPL-SCNC: 3.8 MMOL/L (ref 3.5–5.1)
SODIUM SERPL-SCNC: 137 MMOL/L (ref 136–145)

## 2021-06-13 RX ADMIN — ASPIRIN SCH MG: 81 TABLET ORAL at 09:21

## 2021-06-13 RX ADMIN — Medication SCH EACH: at 09:21

## 2021-06-13 RX ADMIN — MOMETASONE FUROATE AND FORMOTEROL FUMARATE DIHYDRATE SCH PUFF: 100; 5 AEROSOL RESPIRATORY (INHALATION) at 18:51

## 2021-06-13 RX ADMIN — MOMETASONE FUROATE AND FORMOTEROL FUMARATE DIHYDRATE SCH PUFF: 100; 5 AEROSOL RESPIRATORY (INHALATION) at 07:37

## 2021-06-14 LAB
ANION GAP SERPL CALC-SCNC: 18 MMOL/L (ref 10–20)
BUN SERPL-MCNC: 17 MG/DL (ref 8.4–25.7)
CALCIUM SERPL-MCNC: 8.7 MG/DL (ref 7.8–10.44)
CHLORIDE SERPL-SCNC: 101 MMOL/L (ref 98–107)
CO2 SERPL-SCNC: 23 MMOL/L (ref 23–31)
CREAT CL PREDICTED SERPL C-G-VRATE: 75 ML/MIN (ref 70–130)
GLUCOSE SERPL-MCNC: 124 MG/DL (ref 83–110)
POTASSIUM SERPL-SCNC: 3.7 MMOL/L (ref 3.5–5.1)
SODIUM SERPL-SCNC: 138 MMOL/L (ref 136–145)

## 2021-06-14 RX ADMIN — Medication SCH EACH: at 08:55

## 2021-06-14 RX ADMIN — MOMETASONE FUROATE AND FORMOTEROL FUMARATE DIHYDRATE SCH PUFF: 100; 5 AEROSOL RESPIRATORY (INHALATION) at 18:54

## 2021-06-14 RX ADMIN — INSULIN LISPRO PRN UNIT: 100 INJECTION, SOLUTION INTRAVENOUS; SUBCUTANEOUS at 11:46

## 2021-06-14 RX ADMIN — MOMETASONE FUROATE AND FORMOTEROL FUMARATE DIHYDRATE SCH: 100; 5 AEROSOL RESPIRATORY (INHALATION) at 07:39

## 2021-06-14 RX ADMIN — ASPIRIN SCH MG: 81 TABLET ORAL at 08:54

## 2021-06-15 LAB
ANION GAP SERPL CALC-SCNC: 15 MMOL/L (ref 10–20)
BUN SERPL-MCNC: 18 MG/DL (ref 8.4–25.7)
CALCIUM SERPL-MCNC: 8.4 MG/DL (ref 7.8–10.44)
CHLORIDE SERPL-SCNC: 102 MMOL/L (ref 98–107)
CO2 SERPL-SCNC: 25 MMOL/L (ref 23–31)
CREAT CL PREDICTED SERPL C-G-VRATE: 73 ML/MIN (ref 70–130)
GLUCOSE SERPL-MCNC: 126 MG/DL (ref 83–110)
POTASSIUM SERPL-SCNC: 3.7 MMOL/L (ref 3.5–5.1)
SODIUM SERPL-SCNC: 138 MMOL/L (ref 136–145)

## 2021-06-15 RX ADMIN — ASPIRIN SCH MG: 81 TABLET ORAL at 08:25

## 2021-06-15 RX ADMIN — MOMETASONE FUROATE AND FORMOTEROL FUMARATE DIHYDRATE SCH PUFF: 100; 5 AEROSOL RESPIRATORY (INHALATION) at 07:56

## 2021-06-15 RX ADMIN — MOMETASONE FUROATE AND FORMOTEROL FUMARATE DIHYDRATE SCH PUFF: 100; 5 AEROSOL RESPIRATORY (INHALATION) at 18:44

## 2021-06-15 RX ADMIN — Medication SCH EACH: at 08:24

## 2021-06-16 ENCOUNTER — HOSPITAL ENCOUNTER (INPATIENT)
Dept: HOSPITAL 18 - NAV ACUTE | Age: 76
LOS: 10 days | Discharge: HOSPICE | DRG: 293 | End: 2021-06-26
Attending: STUDENT IN AN ORGANIZED HEALTH CARE EDUCATION/TRAINING PROGRAM | Admitting: STUDENT IN AN ORGANIZED HEALTH CARE EDUCATION/TRAINING PROGRAM
Payer: MEDICARE

## 2021-06-16 VITALS — DIASTOLIC BLOOD PRESSURE: 62 MMHG | SYSTOLIC BLOOD PRESSURE: 99 MMHG

## 2021-06-16 VITALS — TEMPERATURE: 97.5 F

## 2021-06-16 VITALS — BODY MASS INDEX: 27.6 KG/M2

## 2021-06-16 DIAGNOSIS — K21.9: ICD-10-CM

## 2021-06-16 DIAGNOSIS — G20: ICD-10-CM

## 2021-06-16 DIAGNOSIS — Z79.01: ICD-10-CM

## 2021-06-16 DIAGNOSIS — K14.0: ICD-10-CM

## 2021-06-16 DIAGNOSIS — E78.5: ICD-10-CM

## 2021-06-16 DIAGNOSIS — Z86.16: ICD-10-CM

## 2021-06-16 DIAGNOSIS — D69.6: ICD-10-CM

## 2021-06-16 DIAGNOSIS — K52.9: ICD-10-CM

## 2021-06-16 DIAGNOSIS — Z87.891: ICD-10-CM

## 2021-06-16 DIAGNOSIS — R53.1: ICD-10-CM

## 2021-06-16 DIAGNOSIS — Z90.89: ICD-10-CM

## 2021-06-16 DIAGNOSIS — R53.81: ICD-10-CM

## 2021-06-16 DIAGNOSIS — K57.90: ICD-10-CM

## 2021-06-16 DIAGNOSIS — Z95.810: ICD-10-CM

## 2021-06-16 DIAGNOSIS — I25.10: ICD-10-CM

## 2021-06-16 DIAGNOSIS — I50.23: ICD-10-CM

## 2021-06-16 DIAGNOSIS — E03.9: ICD-10-CM

## 2021-06-16 DIAGNOSIS — E11.9: ICD-10-CM

## 2021-06-16 DIAGNOSIS — I25.2: ICD-10-CM

## 2021-06-16 DIAGNOSIS — Z95.5: ICD-10-CM

## 2021-06-16 DIAGNOSIS — I11.0: Primary | ICD-10-CM

## 2021-06-16 DIAGNOSIS — Z86.711: ICD-10-CM

## 2021-06-16 DIAGNOSIS — Z95.1: ICD-10-CM

## 2021-06-16 DIAGNOSIS — F32.9: ICD-10-CM

## 2021-06-16 LAB
ANION GAP SERPL CALC-SCNC: 12 MMOL/L (ref 10–20)
BUN SERPL-MCNC: 20 MG/DL (ref 8.4–25.7)
CALCIUM SERPL-MCNC: 8.7 MG/DL (ref 7.8–10.44)
CHLORIDE SERPL-SCNC: 102 MMOL/L (ref 98–107)
CO2 SERPL-SCNC: 26 MMOL/L (ref 23–31)
CREAT CL PREDICTED SERPL C-G-VRATE: 73 ML/MIN (ref 70–130)
CREAT CL PREDICTED SERPL C-G-VRATE: 88 ML/MIN (ref 70–130)
GLUCOSE SERPL-MCNC: 138 MG/DL (ref 83–110)
HGB BLD-MCNC: 12.8 G/DL (ref 14–18)
PLATELET # BLD AUTO: 186 THOU/UL (ref 130–400)
POTASSIUM SERPL-SCNC: 4 MMOL/L (ref 3.5–5.1)
SODIUM SERPL-SCNC: 136 MMOL/L (ref 136–145)

## 2021-06-16 PROCEDURE — 36416 COLLJ CAPILLARY BLOOD SPEC: CPT

## 2021-06-16 PROCEDURE — 80048 BASIC METABOLIC PNL TOTAL CA: CPT

## 2021-06-16 PROCEDURE — 81003 URINALYSIS AUTO W/O SCOPE: CPT

## 2021-06-16 PROCEDURE — 85025 COMPLETE CBC W/AUTO DIFF WBC: CPT

## 2021-06-16 RX ADMIN — MOMETASONE FUROATE AND FORMOTEROL FUMARATE DIHYDRATE SCH: 100; 5 AEROSOL RESPIRATORY (INHALATION) at 10:49

## 2021-06-16 RX ADMIN — ASPIRIN SCH MG: 81 TABLET ORAL at 08:38

## 2021-06-16 RX ADMIN — Medication SCH EACH: at 08:37

## 2021-06-17 RX ADMIN — ALBUTEROL SULFATE PRN PUFF: 90 AEROSOL, METERED RESPIRATORY (INHALATION) at 18:11

## 2021-06-17 RX ADMIN — MOMETASONE FUROATE AND FORMOTEROL FUMARATE DIHYDRATE SCH PUFF: 100; 5 AEROSOL RESPIRATORY (INHALATION) at 21:39

## 2021-06-17 RX ADMIN — ALOGLIPTIN SCH MG: 25 TABLET, FILM COATED ORAL at 21:43

## 2021-06-17 RX ADMIN — ASPIRIN SCH MG: 81 TABLET ORAL at 09:55

## 2021-06-17 RX ADMIN — MOMETASONE FUROATE AND FORMOTEROL FUMARATE DIHYDRATE SCH PUFF: 100; 5 AEROSOL RESPIRATORY (INHALATION) at 09:52

## 2021-06-18 RX ADMIN — ALOGLIPTIN SCH MG: 25 TABLET, FILM COATED ORAL at 21:12

## 2021-06-18 RX ADMIN — MOMETASONE FUROATE AND FORMOTEROL FUMARATE DIHYDRATE SCH PUFF: 100; 5 AEROSOL RESPIRATORY (INHALATION) at 21:09

## 2021-06-18 RX ADMIN — ALOGLIPTIN SCH MG: 25 TABLET, FILM COATED ORAL at 11:50

## 2021-06-18 RX ADMIN — MOMETASONE FUROATE AND FORMOTEROL FUMARATE DIHYDRATE SCH PUFF: 100; 5 AEROSOL RESPIRATORY (INHALATION) at 11:51

## 2021-06-18 RX ADMIN — ASPIRIN SCH MG: 81 TABLET ORAL at 11:51

## 2021-06-19 RX ADMIN — ALBUTEROL SULFATE PRN PUFF: 90 AEROSOL, METERED RESPIRATORY (INHALATION) at 17:44

## 2021-06-19 RX ADMIN — ALOGLIPTIN SCH MG: 25 TABLET, FILM COATED ORAL at 09:15

## 2021-06-19 RX ADMIN — ALOGLIPTIN SCH MG: 25 TABLET, FILM COATED ORAL at 20:57

## 2021-06-19 RX ADMIN — MOMETASONE FUROATE AND FORMOTEROL FUMARATE DIHYDRATE SCH PUFF: 100; 5 AEROSOL RESPIRATORY (INHALATION) at 09:19

## 2021-06-19 RX ADMIN — ASPIRIN SCH MG: 81 TABLET ORAL at 09:17

## 2021-06-19 RX ADMIN — MOMETASONE FUROATE AND FORMOTEROL FUMARATE DIHYDRATE SCH PUFF: 100; 5 AEROSOL RESPIRATORY (INHALATION) at 20:58

## 2021-06-20 RX ADMIN — MOMETASONE FUROATE AND FORMOTEROL FUMARATE DIHYDRATE SCH PUFF: 100; 5 AEROSOL RESPIRATORY (INHALATION) at 08:20

## 2021-06-20 RX ADMIN — ALOGLIPTIN SCH MG: 25 TABLET, FILM COATED ORAL at 08:18

## 2021-06-20 RX ADMIN — ASPIRIN SCH MG: 81 TABLET ORAL at 08:19

## 2021-06-20 RX ADMIN — MOMETASONE FUROATE AND FORMOTEROL FUMARATE DIHYDRATE SCH PUFF: 100; 5 AEROSOL RESPIRATORY (INHALATION) at 20:56

## 2021-06-20 RX ADMIN — ALOGLIPTIN SCH MG: 25 TABLET, FILM COATED ORAL at 20:54

## 2021-06-21 RX ADMIN — ASPIRIN SCH MG: 81 TABLET ORAL at 08:45

## 2021-06-21 RX ADMIN — ALOGLIPTIN SCH MG: 25 TABLET, FILM COATED ORAL at 08:47

## 2021-06-21 RX ADMIN — MOMETASONE FUROATE AND FORMOTEROL FUMARATE DIHYDRATE SCH PUFF: 100; 5 AEROSOL RESPIRATORY (INHALATION) at 20:53

## 2021-06-21 RX ADMIN — ALOGLIPTIN SCH MG: 25 TABLET, FILM COATED ORAL at 20:52

## 2021-06-21 RX ADMIN — MOMETASONE FUROATE AND FORMOTEROL FUMARATE DIHYDRATE SCH PUFF: 100; 5 AEROSOL RESPIRATORY (INHALATION) at 08:49

## 2021-06-22 LAB
ANION GAP SERPL CALC-SCNC: 16 MMOL/L (ref 10–20)
BASOPHILS # BLD AUTO: 0.1 THOU/UL (ref 0–0.2)
BASOPHILS NFR BLD AUTO: 1.3 % (ref 0–1)
BUN SERPL-MCNC: 19 MG/DL (ref 8.4–25.7)
CALCIUM SERPL-MCNC: 8 MG/DL (ref 7.8–10.44)
CHLORIDE SERPL-SCNC: 100 MMOL/L (ref 98–107)
CO2 SERPL-SCNC: 23 MMOL/L (ref 23–31)
CREAT CL PREDICTED SERPL C-G-VRATE: 87 ML/MIN (ref 70–130)
EOSINOPHIL # BLD AUTO: 0.3 THOU/UL (ref 0–0.7)
EOSINOPHIL NFR BLD AUTO: 4.3 % (ref 0–10)
GLUCOSE SERPL-MCNC: 109 MG/DL (ref 83–110)
HGB BLD-MCNC: 14.6 G/DL (ref 14–18)
LYMPHOCYTES # BLD AUTO: 1.1 THOU/UL (ref 1.2–3.4)
LYMPHOCYTES NFR BLD AUTO: 14 % (ref 21–51)
MCH RBC QN AUTO: 28.3 PG (ref 27–31)
MCV RBC AUTO: 91.6 FL (ref 78–98)
MONOCYTES # BLD AUTO: 0.8 THOU/UL (ref 0.11–0.59)
MONOCYTES NFR BLD AUTO: 10.6 % (ref 0–10)
NEUTROPHILS # BLD AUTO: 5.3 THOU/UL (ref 1.4–6.5)
NEUTROPHILS NFR BLD AUTO: 69.9 % (ref 42–75)
PLATELET # BLD AUTO: 208 THOU/UL (ref 130–400)
POTASSIUM SERPL-SCNC: 3.4 MMOL/L (ref 3.5–5.1)
RBC # BLD AUTO: 5.18 MILL/UL (ref 4.7–6.1)
SODIUM SERPL-SCNC: 136 MMOL/L (ref 136–145)
SP GR UR STRIP: 1.02 (ref 1–1.03)
WBC # BLD AUTO: 7.6 THOU/UL (ref 4.8–10.8)

## 2021-06-22 RX ADMIN — ALOGLIPTIN SCH MG: 25 TABLET, FILM COATED ORAL at 09:29

## 2021-06-22 RX ADMIN — ASPIRIN SCH MG: 81 TABLET ORAL at 09:30

## 2021-06-22 RX ADMIN — MOMETASONE FUROATE AND FORMOTEROL FUMARATE DIHYDRATE SCH PUFF: 100; 5 AEROSOL RESPIRATORY (INHALATION) at 21:38

## 2021-06-22 RX ADMIN — ALOGLIPTIN SCH MG: 25 TABLET, FILM COATED ORAL at 21:39

## 2021-06-22 RX ADMIN — MOMETASONE FUROATE AND FORMOTEROL FUMARATE DIHYDRATE SCH PUFF: 100; 5 AEROSOL RESPIRATORY (INHALATION) at 09:28

## 2021-06-23 RX ADMIN — Medication PRN LOZ: at 21:26

## 2021-06-23 RX ADMIN — ASPIRIN SCH MG: 81 TABLET ORAL at 09:18

## 2021-06-23 RX ADMIN — MOMETASONE FUROATE AND FORMOTEROL FUMARATE DIHYDRATE SCH PUFF: 100; 5 AEROSOL RESPIRATORY (INHALATION) at 21:27

## 2021-06-23 RX ADMIN — ALOGLIPTIN SCH MG: 6.25 TABLET, FILM COATED ORAL at 09:17

## 2021-06-23 RX ADMIN — MOMETASONE FUROATE AND FORMOTEROL FUMARATE DIHYDRATE SCH PUFF: 100; 5 AEROSOL RESPIRATORY (INHALATION) at 09:21

## 2021-06-23 RX ADMIN — ALOGLIPTIN SCH MG: 25 TABLET, FILM COATED ORAL at 21:28

## 2021-06-24 RX ADMIN — MOMETASONE FUROATE AND FORMOTEROL FUMARATE DIHYDRATE SCH PUFF: 100; 5 AEROSOL RESPIRATORY (INHALATION) at 20:12

## 2021-06-24 RX ADMIN — Medication PRN LOZ: at 08:40

## 2021-06-24 RX ADMIN — MOMETASONE FUROATE AND FORMOTEROL FUMARATE DIHYDRATE SCH PUFF: 100; 5 AEROSOL RESPIRATORY (INHALATION) at 08:44

## 2021-06-24 RX ADMIN — ALOGLIPTIN SCH MG: 25 TABLET, FILM COATED ORAL at 20:12

## 2021-06-24 RX ADMIN — ASPIRIN SCH MG: 81 TABLET ORAL at 08:41

## 2021-06-24 RX ADMIN — ALOGLIPTIN SCH MG: 6.25 TABLET, FILM COATED ORAL at 08:41

## 2021-06-25 RX ADMIN — MOMETASONE FUROATE AND FORMOTEROL FUMARATE DIHYDRATE SCH PUFF: 100; 5 AEROSOL RESPIRATORY (INHALATION) at 20:33

## 2021-06-25 RX ADMIN — ALOGLIPTIN SCH MG: 6.25 TABLET, FILM COATED ORAL at 08:31

## 2021-06-25 RX ADMIN — MOMETASONE FUROATE AND FORMOTEROL FUMARATE DIHYDRATE SCH PUFF: 100; 5 AEROSOL RESPIRATORY (INHALATION) at 08:48

## 2021-06-25 RX ADMIN — ASPIRIN SCH MG: 81 TABLET ORAL at 08:32

## 2021-06-25 RX ADMIN — ALOGLIPTIN SCH MG: 25 TABLET, FILM COATED ORAL at 20:34

## 2021-06-26 VITALS — SYSTOLIC BLOOD PRESSURE: 99 MMHG | DIASTOLIC BLOOD PRESSURE: 67 MMHG | TEMPERATURE: 98.3 F

## 2021-06-26 RX ADMIN — ASPIRIN SCH MG: 81 TABLET ORAL at 08:48

## 2021-06-26 RX ADMIN — ALOGLIPTIN SCH MG: 6.25 TABLET, FILM COATED ORAL at 08:48

## 2021-06-26 RX ADMIN — MOMETASONE FUROATE AND FORMOTEROL FUMARATE DIHYDRATE SCH PUFF: 100; 5 AEROSOL RESPIRATORY (INHALATION) at 08:49
